# Patient Record
Sex: FEMALE | Race: WHITE | NOT HISPANIC OR LATINO | URBAN - METROPOLITAN AREA
[De-identification: names, ages, dates, MRNs, and addresses within clinical notes are randomized per-mention and may not be internally consistent; named-entity substitution may affect disease eponyms.]

---

## 2018-01-15 ENCOUNTER — CONVERSION ENCOUNTER (OUTPATIENT)
Dept: SURGERY | Facility: CLINIC | Age: 83
End: 2018-01-15

## 2018-01-15 ENCOUNTER — OFFICE VISIT CONVERTED (OUTPATIENT)
Dept: UROLOGY | Facility: CLINIC | Age: 83
End: 2018-01-15
Attending: UROLOGY

## 2018-03-01 ENCOUNTER — OFFICE VISIT CONVERTED (OUTPATIENT)
Dept: CARDIOLOGY | Facility: CLINIC | Age: 83
End: 2018-03-01
Attending: INTERNAL MEDICINE

## 2018-05-16 ENCOUNTER — OFFICE VISIT CONVERTED (OUTPATIENT)
Dept: CARDIOLOGY | Facility: CLINIC | Age: 83
End: 2018-05-16
Attending: INTERNAL MEDICINE

## 2018-08-22 ENCOUNTER — OFFICE VISIT CONVERTED (OUTPATIENT)
Dept: CARDIOLOGY | Facility: CLINIC | Age: 83
End: 2018-08-22
Attending: INTERNAL MEDICINE

## 2019-01-11 ENCOUNTER — HOSPITAL ENCOUNTER (OUTPATIENT)
Dept: OTHER | Facility: HOSPITAL | Age: 84
Discharge: HOME | End: 2019-01-11
Attending: INTERNAL MEDICINE

## 2019-01-11 LAB
ANION GAP SERPL CALC-SCNC: 17 MMOL/L (ref 8–19)
BNP SERPL-MCNC: 3445 PG/ML (ref 0–1800)
BUN SERPL-MCNC: 63 MG/DL (ref 5–25)
BUN/CREAT SERPL: 26 {RATIO} (ref 6–20)
CALCIUM SERPL-MCNC: 9.1 MG/DL (ref 8.7–10.4)
CHLORIDE SERPL-SCNC: 107 MMOL/L (ref 99–111)
CHOLEST SERPL-MCNC: 147 MG/DL (ref 107–200)
CHOLEST/HDLC SERPL: 2.3 {RATIO} (ref 3–6)
CONV CO2: 24 MMOL/L (ref 22–32)
CREAT UR-MCNC: 2.46 MG/DL (ref 0.5–0.9)
GFR SERPLBLD BASED ON 1.73 SQ M-ARVRAT: 16 ML/MIN/{1.73_M2}
GLUCOSE SERPL-MCNC: 113 MG/DL (ref 65–99)
HDLC SERPL-MCNC: 64 MG/DL (ref 40–60)
LDLC SERPL CALC-MCNC: 69 MG/DL (ref 70–100)
OSMOLALITY SERPL CALC.SUM OF ELEC: 317 MOSM/KG (ref 273–304)
POTASSIUM SERPL-SCNC: 4.3 MMOL/L (ref 3.5–5.3)
SODIUM SERPL-SCNC: 144 MMOL/L (ref 135–147)
TRIGL SERPL-MCNC: 69 MG/DL (ref 40–150)
VLDLC SERPL-MCNC: 14 MG/DL (ref 5–37)

## 2019-01-17 ENCOUNTER — OFFICE VISIT CONVERTED (OUTPATIENT)
Dept: CARDIOLOGY | Facility: CLINIC | Age: 84
End: 2019-01-17
Attending: INTERNAL MEDICINE

## 2019-02-26 ENCOUNTER — HOSPITAL ENCOUNTER (OUTPATIENT)
Dept: URGENT CARE | Facility: CLINIC | Age: 84
Discharge: HOME | End: 2019-02-26
Attending: PHYSICIAN ASSISTANT

## 2019-03-12 ENCOUNTER — HOSPITAL ENCOUNTER (OUTPATIENT)
Dept: OTHER | Facility: HOSPITAL | Age: 84
Discharge: HOME | End: 2019-03-12
Attending: INTERNAL MEDICINE

## 2019-03-12 LAB
ANION GAP SERPL CALC-SCNC: 15 MMOL/L (ref 8–19)
BNP SERPL-MCNC: 3182 PG/ML (ref 0–1800)
BUN SERPL-MCNC: 51 MG/DL (ref 5–25)
BUN/CREAT SERPL: 26 {RATIO} (ref 6–20)
CALCIUM SERPL-MCNC: 9.1 MG/DL (ref 8.7–10.4)
CHLORIDE SERPL-SCNC: 105 MMOL/L (ref 99–111)
CONV CO2: 26 MMOL/L (ref 22–32)
CREAT UR-MCNC: 1.99 MG/DL (ref 0.5–0.9)
GFR SERPLBLD BASED ON 1.73 SQ M-ARVRAT: 21 ML/MIN/{1.73_M2}
GLUCOSE SERPL-MCNC: 157 MG/DL (ref 65–99)
OSMOLALITY SERPL CALC.SUM OF ELEC: 311 MOSM/KG (ref 273–304)
POTASSIUM SERPL-SCNC: 4.2 MMOL/L (ref 3.5–5.3)
SODIUM SERPL-SCNC: 142 MMOL/L (ref 135–147)

## 2019-04-03 ENCOUNTER — HOSPITAL ENCOUNTER (OUTPATIENT)
Dept: OTHER | Facility: HOSPITAL | Age: 84
Discharge: HOME | End: 2019-04-03
Attending: INTERNAL MEDICINE

## 2019-04-03 LAB
25(OH)D3 SERPL-MCNC: 43 NG/ML (ref 30–100)
ANION GAP SERPL CALC-SCNC: 17 MMOL/L (ref 8–19)
APPEARANCE UR: CLEAR
BASOPHILS # BLD AUTO: 0.03 10*3/UL (ref 0–0.2)
BASOPHILS NFR BLD AUTO: 0.4 % (ref 0–3)
BILIRUB UR QL: NEGATIVE
BUN SERPL-MCNC: 70 MG/DL (ref 5–25)
BUN/CREAT SERPL: 34 {RATIO} (ref 6–20)
CALCIUM SERPL-MCNC: 9.4 MG/DL (ref 8.7–10.4)
CHLORIDE SERPL-SCNC: 104 MMOL/L (ref 99–111)
COLOR UR: YELLOW
CONV ABS IMM GRAN: 0.02 10*3/UL (ref 0–0.2)
CONV CO2: 27 MMOL/L (ref 22–32)
CONV COLLECTION SOURCE (UA): NORMAL
CONV CREATININE URINE, RANDOM: 45.7 MG/DL (ref 10–300)
CONV IMMATURE GRAN: 0.2 % (ref 0–1.8)
CONV UROBILINOGEN IN URINE BY AUTOMATED TEST STRIP: 0.2 {EHRLICHU}/DL (ref 0.1–1)
CREAT UR-MCNC: 2.07 MG/DL (ref 0.5–0.9)
DEPRECATED RDW RBC AUTO: 45.1 FL (ref 36.4–46.3)
EOSINOPHIL # BLD AUTO: 0.29 10*3/UL (ref 0–0.7)
EOSINOPHIL # BLD AUTO: 3.6 % (ref 0–7)
ERYTHROCYTE [DISTWIDTH] IN BLOOD BY AUTOMATED COUNT: 12.8 % (ref 11.7–14.4)
FERRITIN SERPL-MCNC: 30 NG/ML (ref 10–200)
GFR SERPLBLD BASED ON 1.73 SQ M-ARVRAT: 20 ML/MIN/{1.73_M2}
GLUCOSE SERPL-MCNC: 114 MG/DL (ref 65–99)
GLUCOSE UR QL: NEGATIVE MG/DL
HBA1C MFR BLD: 12.1 G/DL (ref 12–16)
HCT VFR BLD AUTO: 38 % (ref 37–47)
HGB UR QL STRIP: NEGATIVE
IRON SATN MFR SERPL: 17 % (ref 20–55)
IRON SERPL-MCNC: 62 UG/DL (ref 60–170)
KETONES UR QL STRIP: NEGATIVE MG/DL
LEUKOCYTE ESTERASE UR QL STRIP: NEGATIVE
LYMPHOCYTES # BLD AUTO: 3 10*3/UL (ref 1–5)
MCH RBC QN AUTO: 30.4 PG (ref 27–31)
MCHC RBC AUTO-ENTMCNC: 31.8 G/DL (ref 33–37)
MCV RBC AUTO: 95.5 FL (ref 81–99)
MONOCYTES # BLD AUTO: 1.05 10*3/UL (ref 0.2–1.2)
MONOCYTES NFR BLD AUTO: 13.1 % (ref 3–10)
NEUTROPHILS # BLD AUTO: 3.65 10*3/UL (ref 2–8)
NEUTROPHILS NFR BLD AUTO: 45.4 % (ref 30–85)
NITRITE UR QL STRIP: NEGATIVE
NRBC CBCN: 0 % (ref 0–0.7)
OSMOLALITY SERPL CALC.SUM OF ELEC: 317 MOSM/KG (ref 273–304)
PH UR STRIP.AUTO: 5 [PH] (ref 5–8)
PHOSPHATE SERPL-MCNC: 4.2 MG/DL (ref 2.4–4.5)
PLATELET # BLD AUTO: 125 10*3/UL (ref 130–400)
PMV BLD AUTO: 10.6 FL (ref 9.4–12.3)
POTASSIUM SERPL-SCNC: 4.7 MMOL/L (ref 3.5–5.3)
PROT UR QL: NORMAL MG/DL
PROT UR-MCNC: 29.3 MG/DL
PTH-INTACT SERPL-MCNC: 70.9 PG/ML (ref 11.1–79.5)
RBC # BLD AUTO: 3.98 10*6/UL (ref 4.2–5.4)
SODIUM SERPL-SCNC: 143 MMOL/L (ref 135–147)
SP GR UR: 1.02 (ref 1–1.03)
TIBC SERPL-MCNC: 363 UG/DL (ref 245–450)
TRANSFERRIN SERPL-MCNC: 254 MG/DL (ref 250–380)
VARIANT LYMPHS NFR BLD MANUAL: 37.3 % (ref 20–45)
WBC # BLD AUTO: 8.04 10*3/UL (ref 4.8–10.8)

## 2019-07-01 ENCOUNTER — HOSPITAL ENCOUNTER (OUTPATIENT)
Dept: OTHER | Facility: HOSPITAL | Age: 84
Discharge: HOME | End: 2019-07-01
Attending: INTERNAL MEDICINE

## 2019-07-01 LAB
ANION GAP SERPL CALC-SCNC: 19 MMOL/L (ref 8–19)
BNP SERPL-MCNC: 5755 PG/ML (ref 0–1800)
BUN SERPL-MCNC: 70 MG/DL (ref 5–25)
BUN/CREAT SERPL: 33 {RATIO} (ref 6–20)
CALCIUM SERPL-MCNC: 8.9 MG/DL (ref 8.7–10.4)
CHLORIDE SERPL-SCNC: 107 MMOL/L (ref 99–111)
CONV CO2: 23 MMOL/L (ref 22–32)
CREAT UR-MCNC: 2.09 MG/DL (ref 0.5–0.9)
GFR SERPLBLD BASED ON 1.73 SQ M-ARVRAT: 20 ML/MIN/{1.73_M2}
GLUCOSE SERPL-MCNC: 108 MG/DL (ref 65–99)
OSMOLALITY SERPL CALC.SUM OF ELEC: 321 MOSM/KG (ref 273–304)
POTASSIUM SERPL-SCNC: 4.4 MMOL/L (ref 3.5–5.3)
SODIUM SERPL-SCNC: 145 MMOL/L (ref 135–147)

## 2019-07-10 ENCOUNTER — HOSPITAL ENCOUNTER (OUTPATIENT)
Dept: OTHER | Facility: HOSPITAL | Age: 84
Discharge: HOME | End: 2019-07-10
Attending: INTERNAL MEDICINE

## 2019-07-10 LAB
ALBUMIN SERPL-MCNC: 3.9 G/DL (ref 3.5–5)
ALBUMIN/GLOB SERPL: 1.1 {RATIO} (ref 1.4–2.6)
ALP SERPL-CCNC: 63 U/L (ref 38–185)
ALT SERPL-CCNC: 12 U/L (ref 10–40)
ANION GAP SERPL CALC-SCNC: 21 MMOL/L (ref 8–19)
AST SERPL-CCNC: 13 U/L (ref 15–50)
BILIRUB SERPL-MCNC: 0.52 MG/DL (ref 0.2–1.3)
BNP SERPL-MCNC: 5744 PG/ML (ref 0–1800)
BUN SERPL-MCNC: 79 MG/DL (ref 5–25)
BUN/CREAT SERPL: 36 {RATIO} (ref 6–20)
CALCIUM SERPL-MCNC: 9 MG/DL (ref 8.7–10.4)
CHLORIDE SERPL-SCNC: 108 MMOL/L (ref 99–111)
CHOLEST SERPL-MCNC: 117 MG/DL (ref 107–200)
CHOLEST/HDLC SERPL: 2.5 {RATIO} (ref 3–6)
CONV CO2: 21 MMOL/L (ref 22–32)
CONV TOTAL PROTEIN: 7.4 G/DL (ref 6.3–8.2)
CREAT UR-MCNC: 2.2 MG/DL (ref 0.5–0.9)
GFR SERPLBLD BASED ON 1.73 SQ M-ARVRAT: 19 ML/MIN/{1.73_M2}
GLOBULIN UR ELPH-MCNC: 3.5 G/DL (ref 2–3.5)
GLUCOSE SERPL-MCNC: 111 MG/DL (ref 65–99)
HDLC SERPL-MCNC: 46 MG/DL (ref 40–60)
LDLC SERPL CALC-MCNC: 54 MG/DL (ref 70–100)
OSMOLALITY SERPL CALC.SUM OF ELEC: 324 MOSM/KG (ref 273–304)
POTASSIUM SERPL-SCNC: 4.8 MMOL/L (ref 3.5–5.3)
SODIUM SERPL-SCNC: 145 MMOL/L (ref 135–147)
TRIGL SERPL-MCNC: 83 MG/DL (ref 40–150)
VLDLC SERPL-MCNC: 17 MG/DL (ref 5–37)

## 2019-07-17 ENCOUNTER — OFFICE VISIT CONVERTED (OUTPATIENT)
Dept: CARDIOLOGY | Facility: CLINIC | Age: 84
End: 2019-07-17
Attending: NURSE PRACTITIONER

## 2019-08-07 ENCOUNTER — HOSPITAL ENCOUNTER (OUTPATIENT)
Dept: OTHER | Facility: HOSPITAL | Age: 84
Discharge: HOME | End: 2019-08-07
Attending: INTERNAL MEDICINE

## 2019-08-07 LAB
ANION GAP SERPL CALC-SCNC: 22 MMOL/L (ref 8–19)
APPEARANCE UR: CLEAR
BASOPHILS # BLD AUTO: 0.03 10*3/UL (ref 0–0.2)
BASOPHILS NFR BLD AUTO: 0.4 % (ref 0–3)
BILIRUB UR QL: NEGATIVE
BNP SERPL-MCNC: 4998 PG/ML (ref 0–1800)
BUN SERPL-MCNC: 82 MG/DL (ref 5–25)
BUN/CREAT SERPL: 39 {RATIO} (ref 6–20)
CALCIUM SERPL-MCNC: 9.9 MG/DL (ref 8.7–10.4)
CHLORIDE SERPL-SCNC: 95 MMOL/L (ref 99–111)
COLOR UR: YELLOW
CONV ABS IMM GRAN: 0.08 10*3/UL (ref 0–0.2)
CONV BACTERIA: NEGATIVE
CONV CO2: 30 MMOL/L (ref 22–32)
CONV COLLECTION SOURCE (UA): ABNORMAL
CONV CREATININE URINE, RANDOM: 57.9 MG/DL (ref 10–300)
CONV HYALINE CASTS IN URINE MICRO: ABNORMAL /[LPF]
CONV IMMATURE GRAN: 1.1 % (ref 0–1.8)
CONV UROBILINOGEN IN URINE BY AUTOMATED TEST STRIP: 0.2 {EHRLICHU}/DL (ref 0.1–1)
CREAT UR-MCNC: 2.1 MG/DL (ref 0.5–0.9)
DEPRECATED RDW RBC AUTO: 51.1 FL (ref 36.4–46.3)
EOSINOPHIL # BLD AUTO: 0.19 10*3/UL (ref 0–0.7)
EOSINOPHIL # BLD AUTO: 2.6 % (ref 0–7)
ERYTHROCYTE [DISTWIDTH] IN BLOOD BY AUTOMATED COUNT: 14.6 % (ref 11.7–14.4)
GFR SERPLBLD BASED ON 1.73 SQ M-ARVRAT: 20 ML/MIN/{1.73_M2}
GLUCOSE SERPL-MCNC: 125 MG/DL (ref 65–99)
GLUCOSE UR QL: NEGATIVE MG/DL
HCT VFR BLD AUTO: 36.4 % (ref 37–47)
HGB BLD-MCNC: 11.2 G/DL (ref 12–16)
HGB UR QL STRIP: NEGATIVE
KETONES UR QL STRIP: NEGATIVE MG/DL
LEUKOCYTE ESTERASE UR QL STRIP: ABNORMAL
LYMPHOCYTES # BLD AUTO: 2.46 10*3/UL (ref 1–5)
LYMPHOCYTES NFR BLD AUTO: 34 % (ref 20–45)
MCH RBC QN AUTO: 30.4 PG (ref 27–31)
MCHC RBC AUTO-ENTMCNC: 30.8 G/DL (ref 33–37)
MCV RBC AUTO: 98.9 FL (ref 81–99)
MONOCYTES # BLD AUTO: 1.05 10*3/UL (ref 0.2–1.2)
MONOCYTES NFR BLD AUTO: 14.5 % (ref 3–10)
NEUTROPHILS # BLD AUTO: 3.43 10*3/UL (ref 2–8)
NEUTROPHILS NFR BLD AUTO: 47.4 % (ref 30–85)
NITRITE UR QL STRIP: NEGATIVE
NRBC CBCN: 0 % (ref 0–0.7)
OSMOLALITY SERPL CALC.SUM OF ELEC: 322 MOSM/KG (ref 273–304)
PH UR STRIP.AUTO: 5.5 [PH] (ref 5–8)
PLATELET # BLD AUTO: 171 10*3/UL (ref 130–400)
PMV BLD AUTO: 9.7 FL (ref 9.4–12.3)
POTASSIUM SERPL-SCNC: 4.2 MMOL/L (ref 3.5–5.3)
PROT UR QL: ABNORMAL MG/DL
PROT UR-MCNC: 23.7 MG/DL
RBC # BLD AUTO: 3.68 10*6/UL (ref 4.2–5.4)
RBC #/AREA URNS HPF: ABNORMAL /[HPF]
SODIUM SERPL-SCNC: 143 MMOL/L (ref 135–147)
SP GR UR: 1.01 (ref 1–1.03)
SQUAMOUS SPT QL MICRO: ABNORMAL /[HPF]
WBC # BLD AUTO: 7.24 10*3/UL (ref 4.8–10.8)
WBC #/AREA URNS HPF: ABNORMAL /[HPF]

## 2019-08-29 ENCOUNTER — HOSPITAL ENCOUNTER (OUTPATIENT)
Dept: OTHER | Facility: HOSPITAL | Age: 84
Discharge: HOME | End: 2019-08-29
Attending: INTERNAL MEDICINE

## 2019-08-29 LAB
ANION GAP SERPL CALC-SCNC: 21 MMOL/L (ref 8–19)
BASOPHILS # BLD AUTO: 0.02 10*3/UL (ref 0–0.2)
BASOPHILS NFR BLD AUTO: 0.3 % (ref 0–3)
BNP SERPL-MCNC: 2628 PG/ML (ref 0–1800)
BUN SERPL-MCNC: 86 MG/DL (ref 5–25)
BUN/CREAT SERPL: 41 {RATIO} (ref 6–20)
CALCIUM SERPL-MCNC: 9 MG/DL (ref 8.7–10.4)
CHLORIDE SERPL-SCNC: 103 MMOL/L (ref 99–111)
CONV ABS IMM GRAN: 0.01 10*3/UL (ref 0–0.2)
CONV CO2: 26 MMOL/L (ref 22–32)
CONV IMMATURE GRAN: 0.2 % (ref 0–1.8)
CREAT UR-MCNC: 2.12 MG/DL (ref 0.5–0.9)
DEPRECATED RDW RBC AUTO: 45.6 FL (ref 36.4–46.3)
EOSINOPHIL # BLD AUTO: 0.27 10*3/UL (ref 0–0.7)
EOSINOPHIL # BLD AUTO: 4.3 % (ref 0–7)
ERYTHROCYTE [DISTWIDTH] IN BLOOD BY AUTOMATED COUNT: 12.9 % (ref 11.7–14.4)
GFR SERPLBLD BASED ON 1.73 SQ M-ARVRAT: 20 ML/MIN/{1.73_M2}
GLUCOSE SERPL-MCNC: 103 MG/DL (ref 65–99)
HCT VFR BLD AUTO: 41 % (ref 37–47)
HGB BLD-MCNC: 12.3 G/DL (ref 12–16)
LYMPHOCYTES # BLD AUTO: 2.75 10*3/UL (ref 1–5)
LYMPHOCYTES NFR BLD AUTO: 44.3 % (ref 20–45)
MCH RBC QN AUTO: 29.1 PG (ref 27–31)
MCHC RBC AUTO-ENTMCNC: 30 G/DL (ref 33–37)
MCV RBC AUTO: 97.2 FL (ref 81–99)
MONOCYTES # BLD AUTO: 0.82 10*3/UL (ref 0.2–1.2)
MONOCYTES NFR BLD AUTO: 13.2 % (ref 3–10)
NEUTROPHILS # BLD AUTO: 2.34 10*3/UL (ref 2–8)
NEUTROPHILS NFR BLD AUTO: 37.7 % (ref 30–85)
NRBC CBCN: 0 % (ref 0–0.7)
OSMOLALITY SERPL CALC.SUM OF ELEC: 326 MOSM/KG (ref 273–304)
PLATELET # BLD AUTO: 126 10*3/UL (ref 130–400)
PMV BLD AUTO: 10.6 FL (ref 9.4–12.3)
POTASSIUM SERPL-SCNC: 4.9 MMOL/L (ref 3.5–5.3)
RBC # BLD AUTO: 4.22 10*6/UL (ref 4.2–5.4)
SODIUM SERPL-SCNC: 145 MMOL/L (ref 135–147)
WBC # BLD AUTO: 6.21 10*3/UL (ref 4.8–10.8)

## 2019-11-11 ENCOUNTER — HOSPITAL ENCOUNTER (OUTPATIENT)
Dept: OTHER | Facility: HOSPITAL | Age: 84
Discharge: HOME | End: 2019-11-11
Attending: NURSE PRACTITIONER

## 2019-11-11 LAB
ANION GAP SERPL CALC-SCNC: 22 MMOL/L (ref 8–19)
BNP SERPL-MCNC: 3133 PG/ML (ref 0–1800)
BUN SERPL-MCNC: 95 MG/DL (ref 5–25)
BUN/CREAT SERPL: 39 {RATIO} (ref 6–20)
CALCIUM SERPL-MCNC: 9.6 MG/DL (ref 8.7–10.4)
CHLORIDE SERPL-SCNC: 101 MMOL/L (ref 99–111)
CONV CO2: 26 MMOL/L (ref 22–32)
CREAT UR-MCNC: 2.43 MG/DL (ref 0.5–0.9)
GFR SERPLBLD BASED ON 1.73 SQ M-ARVRAT: 17 ML/MIN/{1.73_M2}
GLUCOSE SERPL-MCNC: 152 MG/DL (ref 65–99)
OSMOLALITY SERPL CALC.SUM OF ELEC: 330 MOSM/KG (ref 273–304)
POTASSIUM SERPL-SCNC: 4.9 MMOL/L (ref 3.5–5.3)
SODIUM SERPL-SCNC: 144 MMOL/L (ref 135–147)

## 2019-11-18 ENCOUNTER — OFFICE VISIT CONVERTED (OUTPATIENT)
Dept: CARDIOLOGY | Facility: CLINIC | Age: 84
End: 2019-11-18
Attending: INTERNAL MEDICINE

## 2019-11-18 ENCOUNTER — CONVERSION ENCOUNTER (OUTPATIENT)
Dept: CARDIOLOGY | Facility: CLINIC | Age: 84
End: 2019-11-18

## 2019-11-26 ENCOUNTER — HOSPITAL ENCOUNTER (OUTPATIENT)
Dept: OTHER | Facility: HOSPITAL | Age: 84
Discharge: HOME | End: 2019-11-26
Attending: INTERNAL MEDICINE

## 2019-11-26 LAB
ANION GAP SERPL CALC-SCNC: 19 MMOL/L (ref 8–19)
BASOPHILS # BLD AUTO: 0.03 10*3/UL (ref 0–0.2)
BASOPHILS NFR BLD AUTO: 0.4 % (ref 0–3)
BNP SERPL-MCNC: 3479 PG/ML (ref 0–1800)
BUN SERPL-MCNC: 100 MG/DL (ref 5–25)
BUN/CREAT SERPL: 45 {RATIO} (ref 6–20)
CALCIUM SERPL-MCNC: 9.6 MG/DL (ref 8.7–10.4)
CHLORIDE SERPL-SCNC: 97 MMOL/L (ref 99–111)
CONV ABS IMM GRAN: 0.02 10*3/UL (ref 0–0.2)
CONV CO2: 27 MMOL/L (ref 22–32)
CONV IMMATURE GRAN: 0.3 % (ref 0–1.8)
CREAT UR-MCNC: 2.2 MG/DL (ref 0.5–0.9)
DEPRECATED RDW RBC AUTO: 47.3 FL (ref 36.4–46.3)
EOSINOPHIL # BLD AUTO: 0.28 10*3/UL (ref 0–0.7)
EOSINOPHIL # BLD AUTO: 4.2 % (ref 0–7)
ERYTHROCYTE [DISTWIDTH] IN BLOOD BY AUTOMATED COUNT: 13.7 % (ref 11.7–14.4)
GFR SERPLBLD BASED ON 1.73 SQ M-ARVRAT: 19 ML/MIN/{1.73_M2}
GLUCOSE SERPL-MCNC: 108 MG/DL (ref 65–99)
HCT VFR BLD AUTO: 33.3 % (ref 37–47)
HGB BLD-MCNC: 10.6 G/DL (ref 12–16)
IRON SATN MFR SERPL: 39 % (ref 20–55)
IRON SERPL-MCNC: 119 UG/DL (ref 60–170)
LYMPHOCYTES # BLD AUTO: 2.11 10*3/UL (ref 1–5)
LYMPHOCYTES NFR BLD AUTO: 31.4 % (ref 20–45)
MCH RBC QN AUTO: 30.3 PG (ref 27–31)
MCHC RBC AUTO-ENTMCNC: 31.8 G/DL (ref 33–37)
MCV RBC AUTO: 95.1 FL (ref 81–99)
MONOCYTES # BLD AUTO: 0.83 10*3/UL (ref 0.2–1.2)
MONOCYTES NFR BLD AUTO: 12.4 % (ref 3–10)
NEUTROPHILS # BLD AUTO: 3.44 10*3/UL (ref 2–8)
NEUTROPHILS NFR BLD AUTO: 51.3 % (ref 30–85)
NRBC CBCN: 0 % (ref 0–0.7)
OSMOLALITY SERPL CALC.SUM OF ELEC: 318 MOSM/KG (ref 273–304)
PLATELET # BLD AUTO: 131 10*3/UL (ref 130–400)
PMV BLD AUTO: 10.6 FL (ref 9.4–12.3)
POTASSIUM SERPL-SCNC: 4.5 MMOL/L (ref 3.5–5.3)
RBC # BLD AUTO: 3.5 10*6/UL (ref 4.2–5.4)
SODIUM SERPL-SCNC: 138 MMOL/L (ref 135–147)
TIBC SERPL-MCNC: 309 UG/DL (ref 245–450)
TRANSFERRIN SERPL-MCNC: 216 MG/DL (ref 250–380)
WBC # BLD AUTO: 6.71 10*3/UL (ref 4.8–10.8)

## 2019-12-09 ENCOUNTER — HOSPITAL ENCOUNTER (OUTPATIENT)
Dept: OTHER | Facility: HOSPITAL | Age: 84
Discharge: HOME | End: 2019-12-09
Attending: INTERNAL MEDICINE

## 2019-12-09 LAB
25(OH)D3 SERPL-MCNC: 47.6 NG/ML (ref 30–100)
ANION GAP SERPL CALC-SCNC: 23 MMOL/L (ref 8–19)
APPEARANCE UR: CLEAR
BASOPHILS # BLD AUTO: 0.02 10*3/UL (ref 0–0.2)
BASOPHILS NFR BLD AUTO: 0.3 % (ref 0–3)
BILIRUB UR QL: NEGATIVE
BUN SERPL-MCNC: 89 MG/DL (ref 5–25)
BUN/CREAT SERPL: 39 {RATIO} (ref 6–20)
CALCIUM SERPL-MCNC: 9.3 MG/DL (ref 8.7–10.4)
CHLORIDE SERPL-SCNC: 102 MMOL/L (ref 99–111)
COLOR UR: YELLOW
CONV ABS IMM GRAN: 0.02 10*3/UL (ref 0–0.2)
CONV BACTERIA: ABNORMAL
CONV CO2: 24 MMOL/L (ref 22–32)
CONV COLLECTION SOURCE (UA): ABNORMAL
CONV CREATININE URINE, RANDOM: 63.8 MG/DL (ref 10–300)
CONV HYALINE CASTS IN URINE MICRO: ABNORMAL /[LPF]
CONV IMMATURE GRAN: 0.3 % (ref 0–1.8)
CONV UROBILINOGEN IN URINE BY AUTOMATED TEST STRIP: 0.2 {EHRLICHU}/DL (ref 0.1–1)
CREAT UR-MCNC: 2.3 MG/DL (ref 0.5–0.9)
DEPRECATED RDW RBC AUTO: 47.3 FL (ref 36.4–46.3)
EOSINOPHIL # BLD AUTO: 0.39 10*3/UL (ref 0–0.7)
EOSINOPHIL # BLD AUTO: 6.5 % (ref 0–7)
ERYTHROCYTE [DISTWIDTH] IN BLOOD BY AUTOMATED COUNT: 13 % (ref 11.7–14.4)
FERRITIN SERPL-MCNC: 167 NG/ML (ref 10–200)
GFR SERPLBLD BASED ON 1.73 SQ M-ARVRAT: 18 ML/MIN/{1.73_M2}
GLUCOSE SERPL-MCNC: 105 MG/DL (ref 65–99)
GLUCOSE UR QL: NEGATIVE MG/DL
HCT VFR BLD AUTO: 31.3 % (ref 37–47)
HGB BLD-MCNC: 9.7 G/DL (ref 12–16)
HGB UR QL STRIP: NEGATIVE
IRON SATN MFR SERPL: 30 % (ref 20–55)
IRON SERPL-MCNC: 84 UG/DL (ref 60–170)
KETONES UR QL STRIP: NEGATIVE MG/DL
LEUKOCYTE ESTERASE UR QL STRIP: ABNORMAL
LYMPHOCYTES # BLD AUTO: 1.97 10*3/UL (ref 1–5)
LYMPHOCYTES NFR BLD AUTO: 32.6 % (ref 20–45)
MCH RBC QN AUTO: 30.7 PG (ref 27–31)
MCHC RBC AUTO-ENTMCNC: 31 G/DL (ref 33–37)
MCV RBC AUTO: 99.1 FL (ref 81–99)
MONOCYTES # BLD AUTO: 0.86 10*3/UL (ref 0.2–1.2)
MONOCYTES NFR BLD AUTO: 14.2 % (ref 3–10)
NEUTROPHILS # BLD AUTO: 2.78 10*3/UL (ref 2–8)
NEUTROPHILS NFR BLD AUTO: 46.1 % (ref 30–85)
NITRITE UR QL STRIP: NEGATIVE
NRBC CBCN: 0 % (ref 0–0.7)
OSMOLALITY SERPL CALC.SUM OF ELEC: 326 MOSM/KG (ref 273–304)
PH UR STRIP.AUTO: 5.5 [PH] (ref 5–8)
PHOSPHATE SERPL-MCNC: 6.1 MG/DL (ref 2.4–4.5)
PLATELET # BLD AUTO: 116 10*3/UL (ref 130–400)
PMV BLD AUTO: 10.2 FL (ref 9.4–12.3)
POTASSIUM SERPL-SCNC: 4.9 MMOL/L (ref 3.5–5.3)
PROT UR QL: NEGATIVE MG/DL
PROT UR-MCNC: 10.4 MG/DL
PTH-INTACT SERPL-MCNC: 111 PG/ML (ref 11.1–79.5)
RBC # BLD AUTO: 3.16 10*6/UL (ref 4.2–5.4)
RBC #/AREA URNS HPF: ABNORMAL /[HPF]
SODIUM SERPL-SCNC: 144 MMOL/L (ref 135–147)
SP GR UR: 1.01 (ref 1–1.03)
SQUAMOUS SPT QL MICRO: ABNORMAL /[HPF]
TIBC SERPL-MCNC: 283 UG/DL (ref 245–450)
TRANSFERRIN SERPL-MCNC: 198 MG/DL (ref 250–380)
WBC # BLD AUTO: 6.04 10*3/UL (ref 4.8–10.8)
WBC #/AREA URNS HPF: ABNORMAL /[HPF]

## 2020-01-07 ENCOUNTER — HOSPITAL ENCOUNTER (OUTPATIENT)
Dept: OTHER | Facility: HOSPITAL | Age: 85
Discharge: HOME | End: 2020-01-07
Attending: INTERNAL MEDICINE

## 2020-01-07 LAB
HCT VFR BLD AUTO: 33.5 % (ref 37–47)
HGB BLD-MCNC: 10.4 G/DL (ref 12–16)

## 2020-03-03 ENCOUNTER — HOSPITAL ENCOUNTER (OUTPATIENT)
Dept: MAMMOGRAPHY | Facility: HOSPITAL | Age: 85
Discharge: HOME | End: 2020-03-03
Attending: INTERNAL MEDICINE

## 2020-03-10 ENCOUNTER — HOSPITAL ENCOUNTER (OUTPATIENT)
Dept: ULTRASOUND IMAGING | Facility: HOSPITAL | Age: 85
Discharge: HOME | End: 2020-03-10
Attending: INTERNAL MEDICINE

## 2020-03-18 ENCOUNTER — OFFICE VISIT CONVERTED (OUTPATIENT)
Dept: SURGERY | Facility: CLINIC | Age: 85
End: 2020-03-18
Attending: SURGERY

## 2020-03-19 ENCOUNTER — OFFICE VISIT CONVERTED (OUTPATIENT)
Dept: ONCOLOGY | Facility: HOSPITAL | Age: 85
End: 2020-03-19
Attending: INTERNAL MEDICINE

## 2020-03-19 ENCOUNTER — HOSPITAL ENCOUNTER (OUTPATIENT)
Dept: ONCOLOGY | Facility: HOSPITAL | Age: 85
Discharge: HOME | End: 2020-03-19
Attending: INTERNAL MEDICINE

## 2020-03-20 ENCOUNTER — HOSPITAL ENCOUNTER (OUTPATIENT)
Dept: OTHER | Facility: HOSPITAL | Age: 85
Discharge: HOME | End: 2020-03-20
Attending: INTERNAL MEDICINE

## 2020-03-20 LAB
ANION GAP SERPL CALC-SCNC: 19 MMOL/L (ref 8–19)
APPEARANCE UR: ABNORMAL
BASOPHILS # BLD AUTO: 0.03 10*3/UL (ref 0–0.2)
BASOPHILS NFR BLD AUTO: 0.5 % (ref 0–3)
BILIRUB UR QL: NEGATIVE
BUN SERPL-MCNC: 97 MG/DL (ref 5–25)
BUN/CREAT SERPL: 40 {RATIO} (ref 6–20)
CALCIUM SERPL-MCNC: 9.2 MG/DL (ref 8.7–10.4)
CHLORIDE SERPL-SCNC: 101 MMOL/L (ref 99–111)
COLOR UR: YELLOW
CONV ABS IMM GRAN: 0.02 10*3/UL (ref 0–0.2)
CONV BACTERIA: ABNORMAL
CONV CO2: 27 MMOL/L (ref 22–32)
CONV COLLECTION SOURCE (UA): ABNORMAL
CONV IMMATURE GRAN: 0.4 % (ref 0–1.8)
CONV UROBILINOGEN IN URINE BY AUTOMATED TEST STRIP: 1 {EHRLICHU}/DL (ref 0.1–1)
CREAT UR-MCNC: 2.44 MG/DL (ref 0.5–0.9)
DEPRECATED RDW RBC AUTO: 43.4 FL (ref 36.4–46.3)
EOSINOPHIL # BLD AUTO: 0.3 10*3/UL (ref 0–0.7)
EOSINOPHIL # BLD AUTO: 5.3 % (ref 0–7)
ERYTHROCYTE [DISTWIDTH] IN BLOOD BY AUTOMATED COUNT: 12 % (ref 11.7–14.4)
FERRITIN SERPL-MCNC: 164 NG/ML (ref 10–200)
GFR SERPLBLD BASED ON 1.73 SQ M-ARVRAT: 16 ML/MIN/{1.73_M2}
GLUCOSE SERPL-MCNC: 100 MG/DL (ref 65–99)
GLUCOSE UR QL: NEGATIVE MG/DL
HCT VFR BLD AUTO: 33.8 % (ref 37–47)
HGB BLD-MCNC: 10.3 G/DL (ref 12–16)
HGB UR QL STRIP: NEGATIVE
IRON SATN MFR SERPL: 41 % (ref 20–55)
IRON SERPL-MCNC: 118 UG/DL (ref 60–170)
KETONES UR QL STRIP: NEGATIVE MG/DL
LEUKOCYTE ESTERASE UR QL STRIP: ABNORMAL
LYMPHOCYTES # BLD AUTO: 2.19 10*3/UL (ref 1–5)
LYMPHOCYTES NFR BLD AUTO: 38.7 % (ref 20–45)
MCH RBC QN AUTO: 29.9 PG (ref 27–31)
MCHC RBC AUTO-ENTMCNC: 30.5 G/DL (ref 33–37)
MCV RBC AUTO: 98.3 FL (ref 81–99)
MONOCYTES # BLD AUTO: 0.82 10*3/UL (ref 0.2–1.2)
MONOCYTES NFR BLD AUTO: 14.5 % (ref 3–10)
NEUTROPHILS # BLD AUTO: 2.3 10*3/UL (ref 2–8)
NEUTROPHILS NFR BLD AUTO: 40.6 % (ref 30–85)
NITRITE UR QL STRIP: POSITIVE
NRBC CBCN: 0 % (ref 0–0.7)
OSMOLALITY SERPL CALC.SUM OF ELEC: 324 MOSM/KG (ref 273–304)
PH UR STRIP.AUTO: 5 [PH] (ref 5–8)
PHOSPHATE SERPL-MCNC: 4.9 MG/DL (ref 2.4–4.5)
PLATELET # BLD AUTO: 125 10*3/UL (ref 130–400)
PMV BLD AUTO: 11.2 FL (ref 9.4–12.3)
POTASSIUM SERPL-SCNC: 4.9 MMOL/L (ref 3.5–5.3)
PROT UR QL: 30 MG/DL
PTH-INTACT SERPL-MCNC: 135.3 PG/ML (ref 11.1–79.5)
RBC # BLD AUTO: 3.44 10*6/UL (ref 4.2–5.4)
RBC #/AREA URNS HPF: ABNORMAL /[HPF]
SODIUM SERPL-SCNC: 142 MMOL/L (ref 135–147)
SP GR UR: 1.01 (ref 1–1.03)
SQUAMOUS SPT QL MICRO: ABNORMAL /[HPF]
TIBC SERPL-MCNC: 290 UG/DL (ref 245–450)
TRANSFERRIN SERPL-MCNC: 203 MG/DL (ref 250–380)
WBC # BLD AUTO: 5.66 10*3/UL (ref 4.8–10.8)
WBC #/AREA URNS HPF: ABNORMAL /[HPF]

## 2020-03-31 ENCOUNTER — HOSPITAL ENCOUNTER (OUTPATIENT)
Dept: OTHER | Facility: HOSPITAL | Age: 85
Discharge: HOME | End: 2020-03-31
Attending: INTERNAL MEDICINE

## 2020-03-31 LAB
ANION GAP SERPL CALC-SCNC: 20 MMOL/L (ref 8–19)
BASOPHILS # BLD AUTO: 0.02 10*3/UL (ref 0–0.2)
BASOPHILS NFR BLD AUTO: 0.3 % (ref 0–3)
BNP SERPL-MCNC: 4318 PG/ML (ref 0–1800)
BUN SERPL-MCNC: 92 MG/DL (ref 5–25)
BUN/CREAT SERPL: 38 {RATIO} (ref 6–20)
CALCIUM SERPL-MCNC: 9.1 MG/DL (ref 8.7–10.4)
CHLORIDE SERPL-SCNC: 100 MMOL/L (ref 99–111)
CONV ABS IMM GRAN: 0.03 10*3/UL (ref 0–0.2)
CONV CO2: 26 MMOL/L (ref 22–32)
CONV IMMATURE GRAN: 0.5 % (ref 0–1.8)
CREAT UR-MCNC: 2.4 MG/DL (ref 0.5–0.9)
DEPRECATED RDW RBC AUTO: 43.1 FL (ref 36.4–46.3)
EOSINOPHIL # BLD AUTO: 0.33 10*3/UL (ref 0–0.7)
EOSINOPHIL # BLD AUTO: 5.5 % (ref 0–7)
ERYTHROCYTE [DISTWIDTH] IN BLOOD BY AUTOMATED COUNT: 12 % (ref 11.7–14.4)
GFR SERPLBLD BASED ON 1.73 SQ M-ARVRAT: 17 ML/MIN/{1.73_M2}
GLUCOSE SERPL-MCNC: 116 MG/DL (ref 65–99)
HCT VFR BLD AUTO: 32.6 % (ref 37–47)
HGB BLD-MCNC: 10.1 G/DL (ref 12–16)
IRON SATN MFR SERPL: 33 % (ref 20–55)
IRON SERPL-MCNC: 94 UG/DL (ref 60–170)
LYMPHOCYTES # BLD AUTO: 2.21 10*3/UL (ref 1–5)
LYMPHOCYTES NFR BLD AUTO: 36.7 % (ref 20–45)
MCH RBC QN AUTO: 30.1 PG (ref 27–31)
MCHC RBC AUTO-ENTMCNC: 31 G/DL (ref 33–37)
MCV RBC AUTO: 97 FL (ref 81–99)
MONOCYTES # BLD AUTO: 0.92 10*3/UL (ref 0.2–1.2)
MONOCYTES NFR BLD AUTO: 15.3 % (ref 3–10)
NEUTROPHILS # BLD AUTO: 2.51 10*3/UL (ref 2–8)
NEUTROPHILS NFR BLD AUTO: 41.7 % (ref 30–85)
NRBC CBCN: 0 % (ref 0–0.7)
OSMOLALITY SERPL CALC.SUM OF ELEC: 321 MOSM/KG (ref 273–304)
PLATELET # BLD AUTO: 125 10*3/UL (ref 130–400)
PMV BLD AUTO: 10.3 FL (ref 9.4–12.3)
POTASSIUM SERPL-SCNC: 4.7 MMOL/L (ref 3.5–5.3)
RBC # BLD AUTO: 3.36 10*6/UL (ref 4.2–5.4)
SODIUM SERPL-SCNC: 141 MMOL/L (ref 135–147)
TIBC SERPL-MCNC: 287 UG/DL (ref 245–450)
TRANSFERRIN SERPL-MCNC: 201 MG/DL (ref 250–380)
WBC # BLD AUTO: 6.02 10*3/UL (ref 4.8–10.8)

## 2020-05-05 ENCOUNTER — HOSPITAL ENCOUNTER (OUTPATIENT)
Dept: NUCLEAR MEDICINE | Facility: HOSPITAL | Age: 85
Discharge: HOME | End: 2020-05-05
Attending: INTERNAL MEDICINE

## 2020-05-20 ENCOUNTER — TELEPHONE CONVERTED (OUTPATIENT)
Dept: CARDIOLOGY | Facility: CLINIC | Age: 85
End: 2020-05-20
Attending: INTERNAL MEDICINE

## 2020-06-02 ENCOUNTER — HOSPITAL ENCOUNTER (OUTPATIENT)
Dept: OTHER | Facility: HOSPITAL | Age: 85
Discharge: HOME | End: 2020-06-02
Attending: INTERNAL MEDICINE

## 2020-06-02 LAB
ALBUMIN SERPL-MCNC: 4 G/DL (ref 3.5–5)
ALBUMIN/GLOB SERPL: 1.1 {RATIO} (ref 1.4–2.6)
ALP SERPL-CCNC: 59 U/L (ref 38–185)
ALT SERPL-CCNC: 9 U/L (ref 10–40)
ANION GAP SERPL CALC-SCNC: 17 MMOL/L (ref 8–19)
APPEARANCE UR: ABNORMAL
AST SERPL-CCNC: 14 U/L (ref 15–50)
BASOPHILS # BLD AUTO: 0.03 10*3/UL (ref 0–0.2)
BASOPHILS NFR BLD AUTO: 0.4 % (ref 0–3)
BILIRUB SERPL-MCNC: 0.3 MG/DL (ref 0.2–1.3)
BILIRUB UR QL: NEGATIVE
BNP SERPL-MCNC: 7943 PG/ML (ref 0–1800)
BUN SERPL-MCNC: 91 MG/DL (ref 5–25)
BUN/CREAT SERPL: 43 {RATIO} (ref 6–20)
CALCIUM SERPL-MCNC: 9.2 MG/DL (ref 8.7–10.4)
CHLORIDE SERPL-SCNC: 104 MMOL/L (ref 99–111)
CHOLEST SERPL-MCNC: 125 MG/DL (ref 107–200)
CHOLEST/HDLC SERPL: 2.6 {RATIO} (ref 3–6)
COLOR UR: YELLOW
CONV ABS IMM GRAN: 0.04 10*3/UL (ref 0–0.2)
CONV BACTERIA: ABNORMAL
CONV CO2: 25 MMOL/L (ref 22–32)
CONV COLLECTION SOURCE (UA): ABNORMAL
CONV CREATININE URINE, RANDOM: 63.7 MG/DL (ref 10–300)
CONV HYALINE CASTS IN URINE MICRO: ABNORMAL /[LPF]
CONV IMMATURE GRAN: 0.5 % (ref 0–1.8)
CONV PROTEIN TO CREATININE RATIO (RANDOM URINE): 0.43 {RATIO} (ref 0–0.1)
CONV TOTAL PROTEIN: 7.5 G/DL (ref 6.3–8.2)
CONV UROBILINOGEN IN URINE BY AUTOMATED TEST STRIP: 0.2 {EHRLICHU}/DL (ref 0.1–1)
CREAT UR-MCNC: 2.13 MG/DL (ref 0.5–0.9)
DEPRECATED RDW RBC AUTO: 43.9 FL (ref 36.4–46.3)
EOSINOPHIL # BLD AUTO: 0.47 10*3/UL (ref 0–0.7)
EOSINOPHIL # BLD AUTO: 6.1 % (ref 0–7)
ERYTHROCYTE [DISTWIDTH] IN BLOOD BY AUTOMATED COUNT: 11.9 % (ref 11.7–14.4)
GFR SERPLBLD BASED ON 1.73 SQ M-ARVRAT: 19 ML/MIN/{1.73_M2}
GLOBULIN UR ELPH-MCNC: 3.5 G/DL (ref 2–3.5)
GLUCOSE SERPL-MCNC: 111 MG/DL (ref 65–99)
GLUCOSE UR QL: NEGATIVE MG/DL
HCT VFR BLD AUTO: 32.7 % (ref 37–47)
HDLC SERPL-MCNC: 48 MG/DL (ref 40–60)
HGB BLD-MCNC: 9.8 G/DL (ref 12–16)
HGB UR QL STRIP: NEGATIVE
KETONES UR QL STRIP: NEGATIVE MG/DL
LDLC SERPL CALC-MCNC: 62 MG/DL (ref 70–100)
LEUKOCYTE ESTERASE UR QL STRIP: ABNORMAL
LYMPHOCYTES # BLD AUTO: 2.75 10*3/UL (ref 1–5)
LYMPHOCYTES NFR BLD AUTO: 35.9 % (ref 20–45)
MCH RBC QN AUTO: 29.9 PG (ref 27–31)
MCHC RBC AUTO-ENTMCNC: 30 G/DL (ref 33–37)
MCV RBC AUTO: 99.7 FL (ref 81–99)
MONOCYTES # BLD AUTO: 0.99 10*3/UL (ref 0.2–1.2)
MONOCYTES NFR BLD AUTO: 12.9 % (ref 3–10)
NEUTROPHILS # BLD AUTO: 3.39 10*3/UL (ref 2–8)
NEUTROPHILS NFR BLD AUTO: 44.2 % (ref 30–85)
NITRITE UR QL STRIP: POSITIVE
NRBC CBCN: 0 % (ref 0–0.7)
OSMOLALITY SERPL CALC.SUM OF ELEC: 323 MOSM/KG (ref 273–304)
PH UR STRIP.AUTO: 5 [PH] (ref 5–8)
PHOSPHATE SERPL-MCNC: 4.8 MG/DL (ref 2.4–4.5)
PLATELET # BLD AUTO: 129 10*3/UL (ref 130–400)
PMV BLD AUTO: 10.8 FL (ref 9.4–12.3)
POTASSIUM SERPL-SCNC: 4.4 MMOL/L (ref 3.5–5.3)
PROT UR QL: 30 MG/DL
PROT UR-MCNC: 27.2 MG/DL
RBC # BLD AUTO: 3.28 10*6/UL (ref 4.2–5.4)
RBC #/AREA URNS HPF: ABNORMAL /[HPF]
SODIUM SERPL-SCNC: 142 MMOL/L (ref 135–147)
SP GR UR: 1.01 (ref 1–1.03)
TRIGL SERPL-MCNC: 77 MG/DL (ref 40–150)
VLDLC SERPL-MCNC: 15 MG/DL (ref 5–37)
WBC # BLD AUTO: 7.67 10*3/UL (ref 4.8–10.8)
WBC #/AREA URNS HPF: ABNORMAL /[HPF]

## 2020-06-24 ENCOUNTER — HOSPITAL ENCOUNTER (OUTPATIENT)
Dept: MAMMOGRAPHY | Facility: HOSPITAL | Age: 85
Discharge: HOME | End: 2020-06-24
Attending: NURSE PRACTITIONER

## 2020-06-26 ENCOUNTER — OFFICE VISIT CONVERTED (OUTPATIENT)
Dept: ONCOLOGY | Facility: HOSPITAL | Age: 85
End: 2020-06-26
Attending: INTERNAL MEDICINE

## 2020-07-02 ENCOUNTER — OFFICE VISIT CONVERTED (OUTPATIENT)
Dept: SURGERY | Facility: CLINIC | Age: 85
End: 2020-07-02
Attending: SURGERY

## 2020-07-07 ENCOUNTER — HOSPITAL ENCOUNTER (OUTPATIENT)
Dept: PHYSICAL THERAPY | Facility: CLINIC | Age: 85
Setting detail: RECURRING SERIES
Discharge: STILL ADMITTED OR ADMINISTRATIVE | End: 2020-08-29
Attending: SURGERY

## 2020-07-10 ENCOUNTER — HOSPITAL ENCOUNTER (OUTPATIENT)
Dept: PREADMISSION TESTING | Facility: HOSPITAL | Age: 85
Discharge: HOME | End: 2020-07-10
Attending: SURGERY

## 2020-07-12 LAB — SARS-COV-2 RNA SPEC QL NAA+PROBE: NOT DETECTED

## 2020-07-14 ENCOUNTER — HOSPITAL ENCOUNTER (OUTPATIENT)
Dept: PERIOP | Facility: HOSPITAL | Age: 85
Setting detail: HOSPITAL OUTPATIENT SURGERY
Discharge: HOME | End: 2020-07-14
Attending: SURGERY

## 2020-07-14 LAB — GLUCOSE BLD-MCNC: 104 MG/DL (ref 65–99)

## 2020-07-22 ENCOUNTER — OFFICE VISIT CONVERTED (OUTPATIENT)
Dept: SURGERY | Facility: CLINIC | Age: 85
End: 2020-07-22
Attending: SURGERY

## 2020-07-23 ENCOUNTER — OFFICE VISIT CONVERTED (OUTPATIENT)
Dept: ONCOLOGY | Facility: HOSPITAL | Age: 85
End: 2020-07-23
Attending: INTERNAL MEDICINE

## 2020-07-29 ENCOUNTER — OFFICE VISIT CONVERTED (OUTPATIENT)
Dept: SURGERY | Facility: CLINIC | Age: 85
End: 2020-07-29
Attending: SURGERY

## 2020-07-29 ENCOUNTER — HOSPITAL ENCOUNTER (OUTPATIENT)
Dept: OTHER | Facility: HOSPITAL | Age: 85
Discharge: HOME | End: 2020-07-29
Attending: INTERNAL MEDICINE

## 2020-07-29 LAB
ALBUMIN SERPL-MCNC: 4 G/DL (ref 3.5–5)
ALBUMIN/GLOB SERPL: 1.3 {RATIO} (ref 1.4–2.6)
ALP SERPL-CCNC: 54 U/L (ref 38–185)
ALT SERPL-CCNC: 6 U/L (ref 10–40)
ANION GAP SERPL CALC-SCNC: 18 MMOL/L (ref 8–19)
AST SERPL-CCNC: 12 U/L (ref 15–50)
BASOPHILS # BLD AUTO: 0.03 10*3/UL (ref 0–0.2)
BASOPHILS NFR BLD AUTO: 0.3 % (ref 0–3)
BILIRUB SERPL-MCNC: 0.23 MG/DL (ref 0.2–1.3)
BNP SERPL-MCNC: 8710 PG/ML (ref 0–1800)
BUN SERPL-MCNC: 75 MG/DL (ref 5–25)
BUN/CREAT SERPL: 33 {RATIO} (ref 6–20)
CALCIUM SERPL-MCNC: 9.6 MG/DL (ref 8.7–10.4)
CHLORIDE SERPL-SCNC: 103 MMOL/L (ref 99–111)
CONV ABS IMM GRAN: 0.06 10*3/UL (ref 0–0.2)
CONV CO2: 27 MMOL/L (ref 22–32)
CONV IMMATURE GRAN: 0.6 % (ref 0–1.8)
CONV TOTAL PROTEIN: 7.1 G/DL (ref 6.3–8.2)
CREAT UR-MCNC: 2.25 MG/DL (ref 0.5–0.9)
DEPRECATED RDW RBC AUTO: 43.7 FL (ref 36.4–46.3)
EOSINOPHIL # BLD AUTO: 0.3 10*3/UL (ref 0–0.7)
EOSINOPHIL # BLD AUTO: 3.1 % (ref 0–7)
ERYTHROCYTE [DISTWIDTH] IN BLOOD BY AUTOMATED COUNT: 12 % (ref 11.7–14.4)
GFR SERPLBLD BASED ON 1.73 SQ M-ARVRAT: 18 ML/MIN/{1.73_M2}
GLOBULIN UR ELPH-MCNC: 3.1 G/DL (ref 2–3.5)
GLUCOSE SERPL-MCNC: 60 MG/DL (ref 65–99)
HCT VFR BLD AUTO: 29.9 % (ref 37–47)
HGB BLD-MCNC: 9.2 G/DL (ref 12–16)
IRON SATN MFR SERPL: 18 % (ref 20–55)
IRON SERPL-MCNC: 52 UG/DL (ref 60–170)
LYMPHOCYTES # BLD AUTO: 2.4 10*3/UL (ref 1–5)
LYMPHOCYTES NFR BLD AUTO: 25.2 % (ref 20–45)
MCH RBC QN AUTO: 30.5 PG (ref 27–31)
MCHC RBC AUTO-ENTMCNC: 30.8 G/DL (ref 33–37)
MCV RBC AUTO: 99 FL (ref 81–99)
MONOCYTES # BLD AUTO: 1.21 10*3/UL (ref 0.2–1.2)
MONOCYTES NFR BLD AUTO: 12.7 % (ref 3–10)
NEUTROPHILS # BLD AUTO: 5.53 10*3/UL (ref 2–8)
NEUTROPHILS NFR BLD AUTO: 58.1 % (ref 30–85)
NRBC CBCN: 0 % (ref 0–0.7)
OSMOLALITY SERPL CALC.SUM OF ELEC: 316 MOSM/KG (ref 273–304)
PLATELET # BLD AUTO: 132 10*3/UL (ref 130–400)
PMV BLD AUTO: 10.8 FL (ref 9.4–12.3)
POTASSIUM SERPL-SCNC: 4.9 MMOL/L (ref 3.5–5.3)
RBC # BLD AUTO: 3.02 10*6/UL (ref 4.2–5.4)
SODIUM SERPL-SCNC: 143 MMOL/L (ref 135–147)
T4 FREE SERPL-MCNC: 1.1 NG/DL (ref 0.9–1.8)
TIBC SERPL-MCNC: 292 UG/DL (ref 245–450)
TRANSFERRIN SERPL-MCNC: 204 MG/DL (ref 250–380)
TSH SERPL-ACNC: 0.88 M[IU]/L (ref 0.27–4.2)
WBC # BLD AUTO: 9.53 10*3/UL (ref 4.8–10.8)

## 2020-08-06 ENCOUNTER — HOSPITAL ENCOUNTER (OUTPATIENT)
Dept: CT IMAGING | Facility: HOSPITAL | Age: 85
Discharge: HOME | End: 2020-08-06
Attending: INTERNAL MEDICINE

## 2020-08-13 ENCOUNTER — OFFICE VISIT CONVERTED (OUTPATIENT)
Dept: ONCOLOGY | Facility: HOSPITAL | Age: 85
End: 2020-08-13
Attending: INTERNAL MEDICINE

## 2020-09-02 ENCOUNTER — HOSPITAL ENCOUNTER (OUTPATIENT)
Dept: OTHER | Facility: HOSPITAL | Age: 85
Discharge: HOME | End: 2020-09-02
Attending: INTERNAL MEDICINE

## 2020-09-02 LAB
25(OH)D3 SERPL-MCNC: 43.6 NG/ML (ref 30–100)
ANION GAP SERPL CALC-SCNC: 19 MMOL/L (ref 8–19)
APPEARANCE UR: ABNORMAL
BASOPHILS # BLD AUTO: 0.04 10*3/UL (ref 0–0.2)
BASOPHILS NFR BLD AUTO: 0.5 % (ref 0–3)
BILIRUB UR QL: NEGATIVE
BUN SERPL-MCNC: 103 MG/DL (ref 5–25)
BUN/CREAT SERPL: 37 {RATIO} (ref 6–20)
CALCIUM SERPL-MCNC: 9.5 MG/DL (ref 8.7–10.4)
CHLORIDE SERPL-SCNC: 104 MMOL/L (ref 99–111)
COLOR UR: YELLOW
CONV ABS IMM GRAN: 0.02 10*3/UL (ref 0–0.2)
CONV BACTERIA: ABNORMAL
CONV CO2: 24 MMOL/L (ref 22–32)
CONV COLLECTION SOURCE (UA): ABNORMAL
CONV CREATININE URINE, RANDOM: 66.7 MG/DL (ref 10–300)
CONV HYALINE CASTS IN URINE MICRO: ABNORMAL /[LPF]
CONV IMMATURE GRAN: 0.3 % (ref 0–1.8)
CONV PROTEIN TO CREATININE RATIO (RANDOM URINE): 0.43 {RATIO} (ref 0–0.1)
CONV UROBILINOGEN IN URINE BY AUTOMATED TEST STRIP: 0.2 {EHRLICHU}/DL (ref 0.1–1)
CREAT UR-MCNC: 2.78 MG/DL (ref 0.5–0.9)
DEPRECATED RDW RBC AUTO: 43.7 FL (ref 36.4–46.3)
EOSINOPHIL # BLD AUTO: 0.38 10*3/UL (ref 0–0.7)
EOSINOPHIL # BLD AUTO: 4.8 % (ref 0–7)
ERYTHROCYTE [DISTWIDTH] IN BLOOD BY AUTOMATED COUNT: 11.7 % (ref 11.7–14.4)
FERRITIN SERPL-MCNC: 180 NG/ML (ref 10–200)
GFR SERPLBLD BASED ON 1.73 SQ M-ARVRAT: 14 ML/MIN/{1.73_M2}
GLUCOSE SERPL-MCNC: 110 MG/DL (ref 65–99)
GLUCOSE UR QL: NEGATIVE MG/DL
HCT VFR BLD AUTO: 33.5 % (ref 37–47)
HGB BLD-MCNC: 9.9 G/DL (ref 12–16)
HGB UR QL STRIP: NEGATIVE
IRON SATN MFR SERPL: 31 % (ref 20–55)
IRON SERPL-MCNC: 89 UG/DL (ref 60–170)
KETONES UR QL STRIP: NEGATIVE MG/DL
LEUKOCYTE ESTERASE UR QL STRIP: ABNORMAL
LYMPHOCYTES # BLD AUTO: 2.65 10*3/UL (ref 1–5)
LYMPHOCYTES NFR BLD AUTO: 33.7 % (ref 20–45)
MCH RBC QN AUTO: 29.7 PG (ref 27–31)
MCHC RBC AUTO-ENTMCNC: 29.6 G/DL (ref 33–37)
MCV RBC AUTO: 100.6 FL (ref 81–99)
MONOCYTES # BLD AUTO: 0.79 10*3/UL (ref 0.2–1.2)
MONOCYTES NFR BLD AUTO: 10.1 % (ref 3–10)
NEUTROPHILS # BLD AUTO: 3.98 10*3/UL (ref 2–8)
NEUTROPHILS NFR BLD AUTO: 50.6 % (ref 30–85)
NITRITE UR QL STRIP: POSITIVE
NRBC CBCN: 0 % (ref 0–0.7)
OSMOLALITY SERPL CALC.SUM OF ELEC: 327 MOSM/KG (ref 273–304)
PH UR STRIP.AUTO: 5 [PH] (ref 5–8)
PHOSPHATE SERPL-MCNC: 5.7 MG/DL (ref 2.4–4.5)
PLATELET # BLD AUTO: 132 10*3/UL (ref 130–400)
PMV BLD AUTO: 10.6 FL (ref 9.4–12.3)
POTASSIUM SERPL-SCNC: 5.2 MMOL/L (ref 3.5–5.3)
PROT UR QL: 30 MG/DL
PROT UR-MCNC: 28.7 MG/DL
PTH-INTACT SERPL-MCNC: 101 PG/ML (ref 11.1–79.5)
RBC # BLD AUTO: 3.33 10*6/UL (ref 4.2–5.4)
RBC #/AREA URNS HPF: ABNORMAL /[HPF]
SODIUM SERPL-SCNC: 142 MMOL/L (ref 135–147)
SP GR UR: 1.01 (ref 1–1.03)
SQUAMOUS SPT QL MICRO: ABNORMAL /[HPF]
TIBC SERPL-MCNC: 285 UG/DL (ref 245–450)
TRANSFERRIN SERPL-MCNC: 199 MG/DL (ref 250–380)
WBC # BLD AUTO: 7.86 10*3/UL (ref 4.8–10.8)
WBC #/AREA URNS HPF: ABNORMAL /[HPF]

## 2020-10-05 ENCOUNTER — HOSPITAL ENCOUNTER (OUTPATIENT)
Dept: OTHER | Facility: HOSPITAL | Age: 85
Discharge: HOME | End: 2020-10-05
Attending: INTERNAL MEDICINE

## 2020-10-05 LAB
ANION GAP SERPL CALC-SCNC: 19 MMOL/L (ref 8–19)
BASOPHILS # BLD AUTO: 0.03 10*3/UL (ref 0–0.2)
BASOPHILS NFR BLD AUTO: 0.4 % (ref 0–3)
BNP SERPL-MCNC: 6594 PG/ML (ref 0–1800)
BUN SERPL-MCNC: 93 MG/DL (ref 5–25)
BUN/CREAT SERPL: 33 {RATIO} (ref 6–20)
CALCIUM SERPL-MCNC: 9 MG/DL (ref 8.7–10.4)
CHLORIDE SERPL-SCNC: 104 MMOL/L (ref 99–111)
CONV ABS IMM GRAN: 0.03 10*3/UL (ref 0–0.2)
CONV CO2: 23 MMOL/L (ref 22–32)
CONV IMMATURE GRAN: 0.4 % (ref 0–1.8)
CREAT UR-MCNC: 2.79 MG/DL (ref 0.5–0.9)
DEPRECATED RDW RBC AUTO: 43.5 FL (ref 36.4–46.3)
EOSINOPHIL # BLD AUTO: 0.24 10*3/UL (ref 0–0.7)
EOSINOPHIL # BLD AUTO: 3.4 % (ref 0–7)
ERYTHROCYTE [DISTWIDTH] IN BLOOD BY AUTOMATED COUNT: 11.9 % (ref 11.7–14.4)
GFR SERPLBLD BASED ON 1.73 SQ M-ARVRAT: 14 ML/MIN/{1.73_M2}
GLUCOSE SERPL-MCNC: 147 MG/DL (ref 65–99)
HCT VFR BLD AUTO: 31.7 % (ref 37–47)
HGB BLD-MCNC: 9.4 G/DL (ref 12–16)
IRON SATN MFR SERPL: 40 % (ref 20–55)
IRON SERPL-MCNC: 100 UG/DL (ref 60–170)
LYMPHOCYTES # BLD AUTO: 1.62 10*3/UL (ref 1–5)
LYMPHOCYTES NFR BLD AUTO: 23 % (ref 20–45)
MCH RBC QN AUTO: 29.6 PG (ref 27–31)
MCHC RBC AUTO-ENTMCNC: 29.7 G/DL (ref 33–37)
MCV RBC AUTO: 99.7 FL (ref 81–99)
MONOCYTES # BLD AUTO: 0.96 10*3/UL (ref 0.2–1.2)
MONOCYTES NFR BLD AUTO: 13.7 % (ref 3–10)
NEUTROPHILS # BLD AUTO: 4.15 10*3/UL (ref 2–8)
NEUTROPHILS NFR BLD AUTO: 59.1 % (ref 30–85)
NRBC CBCN: 0 % (ref 0–0.7)
OSMOLALITY SERPL CALC.SUM OF ELEC: 323 MOSM/KG (ref 273–304)
PLATELET # BLD AUTO: 118 10*3/UL (ref 130–400)
PMV BLD AUTO: 11.6 FL (ref 9.4–12.3)
POTASSIUM SERPL-SCNC: 5 MMOL/L (ref 3.5–5.3)
RBC # BLD AUTO: 3.18 10*6/UL (ref 4.2–5.4)
SODIUM SERPL-SCNC: 141 MMOL/L (ref 135–147)
TIBC SERPL-MCNC: 247 UG/DL (ref 245–450)
TRANSFERRIN SERPL-MCNC: 173 MG/DL (ref 250–380)
WBC # BLD AUTO: 7.03 10*3/UL (ref 4.8–10.8)

## 2020-11-27 ENCOUNTER — HOSPITAL ENCOUNTER (OUTPATIENT)
Dept: OTHER | Facility: HOSPITAL | Age: 85
Discharge: HOME | End: 2020-11-27
Attending: INTERNAL MEDICINE

## 2020-11-27 LAB
ALBUMIN SERPL-MCNC: 3.5 G/DL (ref 3.5–5)
ALBUMIN/GLOB SERPL: 0.9 {RATIO} (ref 1.4–2.6)
ALP SERPL-CCNC: 70 U/L (ref 38–185)
ALT SERPL-CCNC: 10 U/L (ref 10–40)
ANION GAP SERPL CALC-SCNC: 15 MMOL/L (ref 8–19)
APPEARANCE UR: ABNORMAL
AST SERPL-CCNC: 24 U/L (ref 15–50)
BASOPHILS # BLD AUTO: 0.03 10*3/UL (ref 0–0.2)
BASOPHILS NFR BLD AUTO: 0.4 % (ref 0–3)
BILIRUB SERPL-MCNC: 0.32 MG/DL (ref 0.2–1.3)
BILIRUB UR QL: NEGATIVE
BNP SERPL-MCNC: ABNORMAL PG/ML (ref 0–1800)
BUN SERPL-MCNC: 73 MG/DL (ref 5–25)
BUN/CREAT SERPL: 32 {RATIO} (ref 6–20)
CALCIUM SERPL-MCNC: 11.1 MG/DL (ref 8.7–10.4)
CHLORIDE SERPL-SCNC: 104 MMOL/L (ref 99–111)
CHOLEST SERPL-MCNC: 114 MG/DL (ref 107–200)
CHOLEST/HDLC SERPL: 2.2 {RATIO} (ref 3–6)
COLOR UR: YELLOW
CONV ABS IMM GRAN: 0.03 10*3/UL (ref 0–0.2)
CONV BACTERIA: ABNORMAL
CONV CO2: 25 MMOL/L (ref 22–32)
CONV COLLECTION SOURCE (UA): ABNORMAL
CONV HYALINE CASTS IN URINE MICRO: ABNORMAL /[LPF]
CONV IMMATURE GRAN: 0.4 % (ref 0–1.8)
CONV TOTAL PROTEIN: 7.2 G/DL (ref 6.3–8.2)
CONV UROBILINOGEN IN URINE BY AUTOMATED TEST STRIP: 0.2 {EHRLICHU}/DL (ref 0.1–1)
CREAT UR-MCNC: 2.3 MG/DL (ref 0.5–0.9)
DEPRECATED RDW RBC AUTO: 43.9 FL (ref 36.4–46.3)
EOSINOPHIL # BLD AUTO: 0.28 10*3/UL (ref 0–0.7)
EOSINOPHIL # BLD AUTO: 3.8 % (ref 0–7)
ERYTHROCYTE [DISTWIDTH] IN BLOOD BY AUTOMATED COUNT: 12.2 % (ref 11.7–14.4)
GFR SERPLBLD BASED ON 1.73 SQ M-ARVRAT: 18 ML/MIN/{1.73_M2}
GLOBULIN UR ELPH-MCNC: 3.7 G/DL (ref 2–3.5)
GLUCOSE SERPL-MCNC: 104 MG/DL (ref 65–99)
GLUCOSE UR QL: NEGATIVE MG/DL
HCT VFR BLD AUTO: 29.2 % (ref 37–47)
HDLC SERPL-MCNC: 52 MG/DL (ref 40–60)
HGB BLD-MCNC: 9 G/DL (ref 12–16)
HGB UR QL STRIP: NEGATIVE
KETONES UR QL STRIP: NEGATIVE MG/DL
LDLC SERPL CALC-MCNC: 42 MG/DL (ref 70–100)
LEUKOCYTE ESTERASE UR QL STRIP: ABNORMAL
LYMPHOCYTES # BLD AUTO: 2.03 10*3/UL (ref 1–5)
LYMPHOCYTES NFR BLD AUTO: 27.4 % (ref 20–45)
MCH RBC QN AUTO: 30.2 PG (ref 27–31)
MCHC RBC AUTO-ENTMCNC: 30.8 G/DL (ref 33–37)
MCV RBC AUTO: 98 FL (ref 81–99)
MONOCYTES # BLD AUTO: 0.93 10*3/UL (ref 0.2–1.2)
MONOCYTES NFR BLD AUTO: 12.6 % (ref 3–10)
NEUTROPHILS # BLD AUTO: 4.11 10*3/UL (ref 2–8)
NEUTROPHILS NFR BLD AUTO: 55.4 % (ref 30–85)
NITRITE UR QL STRIP: POSITIVE
NRBC CBCN: 0 % (ref 0–0.7)
OSMOLALITY SERPL CALC.SUM OF ELEC: 312 MOSM/KG (ref 273–304)
PH UR STRIP.AUTO: 5 [PH] (ref 5–8)
PHOSPHATE SERPL-MCNC: 3.5 MG/DL (ref 2.4–4.5)
PLATELET # BLD AUTO: 127 10*3/UL (ref 130–400)
PMV BLD AUTO: 10.5 FL (ref 9.4–12.3)
POTASSIUM SERPL-SCNC: 4.4 MMOL/L (ref 3.5–5.3)
PROT UR QL: 100 MG/DL
RBC # BLD AUTO: 2.98 10*6/UL (ref 4.2–5.4)
RBC #/AREA URNS HPF: ABNORMAL /[HPF]
SODIUM SERPL-SCNC: 140 MMOL/L (ref 135–147)
SP GR UR: 1.01 (ref 1–1.03)
SQUAMOUS SPT QL MICRO: ABNORMAL /[HPF]
TRIGL SERPL-MCNC: 102 MG/DL (ref 40–150)
VLDLC SERPL-MCNC: 20 MG/DL (ref 5–37)
WBC # BLD AUTO: 7.41 10*3/UL (ref 4.8–10.8)
WBC #/AREA URNS HPF: ABNORMAL /[HPF]

## 2020-12-04 ENCOUNTER — HOSPITAL ENCOUNTER (OUTPATIENT)
Dept: ONCOLOGY | Facility: HOSPITAL | Age: 85
Discharge: HOME | End: 2020-12-04
Attending: NURSE PRACTITIONER

## 2020-12-04 ENCOUNTER — OFFICE VISIT CONVERTED (OUTPATIENT)
Dept: ONCOLOGY | Facility: HOSPITAL | Age: 85
End: 2020-12-04
Attending: NURSE PRACTITIONER

## 2020-12-07 ENCOUNTER — OFFICE VISIT CONVERTED (OUTPATIENT)
Dept: CARDIOLOGY | Facility: CLINIC | Age: 85
End: 2020-12-07
Attending: INTERNAL MEDICINE

## 2021-05-10 NOTE — H&P
History and Physical      Patient Name: Apolonia Young   Patient ID: [id number]   Sex: Female   YOB: 1926    Primary Care Provider: Elver Byrd MD   Referring Provider: Elver Byrd MD    Visit Date: July 2, 2020    Provider: Yael Silva MD   Location[facility]   Location Address: [address] [place] [address]  [id number]   Location Phone: [phone]          Chief Complaint  · Breast Cancer  · Pre-Surgical History and Physical Examination for [facility]  · Consents for Surgery      History Of Present Illness  Apolonia Yougn is a 89 year old /White female who is referred from Elver Byrd MD ; please see last note for pathology... she has been on hormonal suppression therapy but feels the mass is enlarging in size and further involving the nipple and she was referred back for lumpectomy. I discussed with her we would likely need to excise nipple as well as it feels and appears to be involved with mass.             Past Medical History  Anemia, Unspecified; Aortic stenosis; Aortic valve insufficiency; Arthritis; Basal Cell Carcinoma; Congestive heart failure; Diabetes Mellitus, Type II; Gastric Ulcer; GERD; High blood pressure; Hyperlipidemia; Postoperative Follow-Up Visit; Renal cancer, right; Renal mass; Urinary Incontinence         Past Surgical History  Aortic valve replacement; Lymph Node Surgery; Nephrectomy, right kidney         Medication List  aspirin oral; Bystolic oral; Bystolic 2.5 mg oral tablet; Caduet oral; hydralazine 100 mg oral tablet; hydralazine 50 mg oral tablet; lansoprazole 30 mg oral capsule,delayed release(DR/EC); Lasix 40 mg oral tablet; nitroglycerin 0.4 mg sublingual tablet, sublingual; torsemide 20 mg oral tablet; Vitamin D3 oral         Allergy List  Adhesives; Latex       Allergies Reconciled  Family Medical History  Renal Cancer         Social History  Tobacco (Never)         Review of  "Systems  · Constitutional  o Denies  o : fever, chills  · Breasts  o * See HPI  · Cardiovascular  o Denies  o : chest pain, cardiac murmurs, irregular heart beats, dyspnea on exertion  · Integument  o Denies  o : rash, itching, new skin lesions  · Heme-Lymph  o Denies  o : easy bleeding, easy bruising, lymph node enlargement or tenderness      Vitals  Date Time BP Position Site L\R Cuff Size HR RR TEMP (F) WT  HT  BMI kg/m2 BSA m2 O2 Sat        07/02/2020 10:06 AM       16  155lbs 0oz 5'  3\" 27.46 1.77           Physical Examination  · Constitutional  o Appearance  o : A well-nourished, well-developed patient who ambulates without difficulty, Alert and Oriented X3.  · Respiratory  o Respiratory Effort  o : breathing unlabored  o Inspection of Chest  o : breaths equal bilaterally  · Breasts  o Inspection of Breasts  o : developmental state normal for age  o Palpation of Breasts, Axillae  o : no masses or tenderness noted on palpation on right, left breast with palpable mass just lateral to areola and involving nipple, no other masses palpable, no LAD bulky          Assessment  · Breast Cancer: Upper-outer Quadrant     174.4  · Preoperative Examination     V72.83      Plan  · Orders  o General Surgery Order (GENOR) - - 07/02/2020  o Lymphedema Clinic Consult (Order_PT/OT Consult for Bioimpedence Lymph fluid analysis,Pre-op and retest Post-op, every 3 mo. Yr. 1-3, 6 mo. Yr. 4-5, annually year 6+) (LYMCL) - - 07/02/2020  o [facility] Pre-Op Covid-19 Screening (91752) - 174.4 - 07/10/2020  o General Surgery Order (GENOR) - 174.4 - 07/14/2020  o [place] Node(Left) Identification (Detection) [[id number]] (59278) - 174.4 - 07/14/2020  · Medications  o Medications have been Reconciled  o Transition of Care or Provider Policy  · Instructions  o DISCUSSION:  o PLAN:  o Schedule operation  o ****Surgical Treatment Addendum****  o Breast Conserving Therapy: Offered  o [place] node dissection offfered and will perform as a part of " surgery.  o Reconstructive/Plastice Surgery referral offered prior to surgery and patient declined. Reason: pt does not want   o Handouts Provided-Pre-Procedure Instructions including date and time and location of procedure.  o ****Surgical Orders****  o ****Patient Status****  o RISK AND BENEFITS:  o Consent for surgery: Given these options, the patient has verbally expressed an understanding of the risks of surgery and finds these risks acceptable. We will proceed with surgery as soon as possible.  o Possible risks, complications, benefits, and alternatives to surgical or invasive procedure have been explained to patient and/or legal guardian.  o O.R. PREP: Per protocol  o IV: Per Anesthesia  o Please sign permit for: left lumpectomy and left sentinel node identification and biopsy  o Kefzol 2 grams IV on call to OR.  o The above History and Physical Examination has been completed within 30 days of admission.            Electronically Signed by: Yael Silva MD -Author on July 2, 2020 10:18:41 AM

## 2021-05-10 NOTE — H&P
History and Physical      Patient Name: Apolonia Young   Patient ID: [id number]   Sex: Female   YOB: 1926    Primary Care Provider: Elver Byrd MD   Referring Provider: Elver Byrd MD    Visit Date: July 29, 2020    Provider: Yael Silva MD   Location: [facility]   Location Address: [address] [place] [address]  [id number]   Location Phone: [phone]          Chief Complaint  · Follow Up Surgery      History Of Present Illness  Apolonia Young is a 89 year old /White female who is here today for follow up of: partial mastectomy with sentinel node.   She is doing well-status post surgery. Incision healing very well and she has a consult with Dr Mix to review imaging.       Past Medical History  Anemia, Unspecified; Aortic stenosis; Aortic valve insufficiency; Arthritis; Basal Cell Carcinoma; Congestive heart failure; Diabetes Mellitus, Type II; Gastric Ulcer; GERD; High blood pressure; Hyperlipidemia; Postoperative Follow-Up Visit; Renal cancer, right; Renal mass; Urinary Incontinence         Past Surgical History  Aortic valve replacement; Lumpectomy of left breast; Lymph Node Surgery; Nephrectomy, right kidney         Medication List  amlodipine 5 mg oral tablet; aspirin oral; atorvastatin 10 mg oral tablet; Bystolic oral; Bystolic 2.5 mg oral tablet; Caduet oral; hydralazine 100 mg oral tablet; hydralazine 50 mg oral tablet; lansoprazole 30 mg oral capsule,delayed release(DR/EC); Lasix 40 mg oral tablet; nitroglycerin 0.4 mg sublingual tablet, sublingual; torsemide 20 mg oral tablet; Vitamin D3 oral         Allergy List  Adhesives; Latex       Allergies Reconciled  Family Medical History  Renal Cancer         Social History  Tobacco (Never)         Review of Systems  · Constitutional  o Denies  o : fever, chills  · Eyes  o Denies  o : yellowish discoloration of the eyes, eye pain  · HENT  o Denies  o : difficulty swallowing,  "hoarseness  · Cardiovascular  o Denies  o : chest pain on exertion, irregular heart beats  · Respiratory  o Denies  o : shortness of breath, cough  · Gastrointestinal  o Denies  o : nausea, vomiting, diarrhea, constipation  · Integument  o Admits  o : see HPI for surgical incision healing  · Neurologic  o Denies  o : tingling or numbness, loss of balance  · Musculoskeletal  o Denies  o : joint pain, back pain  · Endocrine  o Denies  o : weight gain, weight loss  · All Others Negative      Vitals  Date Time BP Position Site L\R Cuff Size HR RR TEMP (F) WT  HT  BMI kg/m2 BSA m2 O2 Sat HC       07/29/2020 11:13 AM       16  155lbs 0oz 5'  1\" 29.29 1.74           Physical Examination  · Constitutional  o Appearance  o : well developed/well nourished patient in no apparent distress  · Respiratory  o Inspection of Chest  o : equal breaths bilaterally  · Gastrointestinal  o Abdominal Examination  o : soft/nontender, nondistended, no organomegaly appreciated  · Post Surgical Incision  o Surgical wound  o : healing well, no erythema          Assessment  · Postoperative Exam Following Surgery     V67.00/Z09      Plan  · Medications  o Medications have been Reconciled  o Transition of Care or Provider Policy  · Instructions  o Keep apt with Dr Mix  o Return to office with any issues.            Electronically Signed by: Yael Silva MD -Author on July 29, 2020 11:19:15 AM  "

## 2021-05-10 NOTE — H&P
History and Physical      Patient Name: Apolonia Young   Patient ID: [id number]   Sex: Female   YOB: 1926    Primary Care Provider: Elver Byrd MD   Referring Provider: Elver Byrd MD    Visit Date: July 22, 2020    Provider: Yael Silva MD   Location[facility]   Location Address: [address] [place] [address]  [id number]   Location Phone: [phone]          Chief Complaint  · Follow Up Surgery      History Of Present Illness  Apolonia Young is a 89 year old /White female who is here today for follow up of: partial left mastectomy with nodes.   She is doing well-status post surgery. Pathology was negative margins and 2/2 nodes.       Past Medical History  Anemia, Unspecified; Aortic stenosis; Aortic valve insufficiency; Arthritis; Basal Cell Carcinoma; Congestive heart failure; Diabetes Mellitus, Type II; Gastric Ulcer; GERD; High blood pressure; Hyperlipidemia; Postoperative Follow-Up Visit; Renal cancer, right; Renal mass; Urinary Incontinence         Past Surgical History  Aortic valve replacement; Lumpectomy of left breast; Lymph Node Surgery; Nephrectomy, right kidney         Medication List  amlodipine 5 mg oral tablet; aspirin oral; atorvastatin 10 mg oral tablet; Bystolic oral; Bystolic 2.5 mg oral tablet; Caduet oral; hydralazine 100 mg oral tablet; hydralazine 50 mg oral tablet; lansoprazole 30 mg oral capsule,delayed release(DR/EC); Lasix 40 mg oral tablet; nitroglycerin 0.4 mg sublingual tablet, sublingual; torsemide 20 mg oral tablet; Vitamin D3 oral         Allergy List  Adhesives; Latex       Allergies Reconciled  Family Medical History  Renal Cancer         Social History  Tobacco (Never)         Review of Systems  · Constitutional  o Denies  o : fever, chills  · Eyes  o Denies  o : yellowish discoloration of the eyes, eye pain  · HENT  o Denies  o : difficulty swallowing, hoarseness  · Cardiovascular  o Denies  o : chest pain on exertion,  "irregular heart beats  · Respiratory  o Denies  o : shortness of breath, cough  · Gastrointestinal  o Denies  o : nausea, vomiting, diarrhea, constipation  · Integument  o Admits  o : see HPI for surgical incision healing  · Neurologic  o Denies  o : tingling or numbness, loss of balance  · Musculoskeletal  o Denies  o : joint pain, back pain  · Endocrine  o Denies  o : weight gain, weight loss  · All Others Negative      Vitals  Date Time BP Position Site L\R Cuff Size HR RR TEMP (F) WT  HT  BMI kg/m2 BSA m2 O2 Sat HC       07/22/2020 11:21 AM       16  155lbs 0oz 5'  3\" 27.46 1.77           Physical Examination  · Constitutional  o Appearance  o : well developed/well nourished patient in no apparent distress  · Respiratory  o Inspection of Chest  o : equal breaths bilaterally  · Gastrointestinal  o Abdominal Examination  o : soft/nontender, nondistended, no organomegaly appreciated  · Post Surgical Incision  o Surgical wound  o : healing well, no erythema, drain removed today          Assessment  · Postoperative Exam Following Surgery     V67.00/Z09      Plan  · Medications  o Medications have been Reconciled  o Transition of Care or Provider Policy  · Instructions  o Return to office with any issues.  o Keep apt with oncology  o F/U in 1 weeks.            Electronically Signed by: Yael Silva MD -Author on July 22, 2020 11:35:27 AM  "

## 2021-05-13 NOTE — PROGRESS NOTES
Quick Note      Patient Name: Apolonia Young   Patient ID: [id number]   Sex: Female   YOB: 1926    Primary Care Provider: Elver Byrd MD   Referring Provider: Elver Byrd MD    Visit Date: May 20, 2020    Provider: Darrian Mace MD   Location: [facility]   Location Address: [address] [place] [address], [facility]   [place]ASID[place] Ness  [id number]   Location Phone: [phone]          History Of Present Illness  TELEHEALTH TELEPHONE VISIT  Chief Complaint: Follow up of congestive heart failure and aortic valve stenosis.   Apolonia Young is a 89 year old /White female with congestive heart failure, chronic kidney disease, and status post aortic valve replacement (valve-in-valve procedure) who has done better than before. She had a YVAN done at [facility], which did not reveal any severe prosthetic aortic valve stenosis, and has been recommended to continue medical treatment of her diastolic heart failure. She denies any chest pain, palpitations, dizziness, or syncope. She is short of breath with exertion, but improved. She is presenting for evaluation via telehealth telephone visit. Verbal consent obtained before beginning visit. Telehealth visit due to COVID-19.   Provider spent 6 minutes with the patient during the telehealth visit.   The following staff were present during this visit: Provider only.   PAST MEDICAL HISTORY: Bioprosthetic aortic valve with aortic stenosis; Chronic diastolic heart failure; Chronic kidney disease; Diabetes mellitus; Hyperlipidemia; Hypertension.   FAMILY HISTORY: Positive for diabetes mellitus, hypertension, and heart disease.   PSYCHOSOCIAL HISTORY: Denies alcohol or tobacco use.   CURRENT MEDICATIONS: Lansoprazole 30 mg daily; hydralazine 75 mg b.i.d.; torsemide 40 mg b.i.d.; Bystolic 5 mg daily; atorvastatin 10 mg daily; amlodipine 5 mg daily; montelukast 10 mg daily; aspirin 81 mg daily; cetirizine 10 mg daily; anastrozole 1 mg daily.  Dosage and frequency of the medications reviewed with the patient.       Vitals     Per patient, at-home vitals:   Blood pressure 139/61.  Heart rate 58.       Physical Examination  · Labs  o Labs  o : She was supposed to have blood work done, but did not do so.          Assessment     1.  Aortic valve disease.  2.  Congestive heart failure, chronic, diastolic, class II-III, improved.  3.  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  4.  Hypertension.  5.  Hyperlipidemia.  6.  Diabetes mellitus.       Plan     1.  Discussed with her the need to get her blood work done in the next few days.    2.  Continue home blood pressure monitoring.  3.  Follow up in 6 months.      Darrian Mace MD, [facility]  PM/vm             Electronically Signed by: Darrian Mace MD -Author on June 22, 2020 06:28:15 PM

## 2021-05-13 NOTE — PROGRESS NOTES
Progress Note      Patient Name: Apolonia Young   Patient ID: [id number]   Sex: Female   YOB: 1926    Primary Care Provider: Elver Byrd MD   Referring Provider: Elver Byrd MD    Visit Date: December 7, 2020    Provider: Darrian Mace MD   Location: [facility]   Location Address: [address] [place] [address], [address]   [place]  [id number]   Location Phone: [phone]          Chief Complaint     Shortness of breath.  Elevated blood pressures.       History Of Present Illness  REFERRING PROVIDER: Elver Byrd MD   Apolonia Young is a 89 year old /White female who continues to be short of breath. It is moderate even with mild exertion. She is on O2 at 3 liters now for the past year. She is also complaining of complete weakness and has not been able to walk in the last month. Denies any chest pain. No palpitations, swelling, dizziness, syncope, PND, or orthopnea. Since she was last here, she has had left breast removed. She is also being monitored for a spot on the lungs and the liver, but she declines to have any treatment for it.   PAST MEDICAL HISTORY: Bioprosthetic aortic valve with aortic stenosis; Chronic diastolic heart failure; Chronic kidney disease; Diabetes mellitus; Hyperlipidemia; Hypertension.   PSYCHOSOCIAL HISTORY: Denies tobacco use. Denies alcohol use.   CURRENT MEDICATIONS: Vitamin C 500 mg daily; montelukast 10 mg daily; torsemide 20 mg b.i.d.; lansoprazole 30 mg every other day; tamoxifen 20 g daily; Bystolic 2.5 mg daily; cetirizine 10 mg daily; amlodipine 5 mg daily; hydralazine 100 mg b.i.d.; atorvastatin 10 mg q. h.s.; ferrous sulfate 325 mg daily; Colace 100 mg p.r.n.; aspirin 81 mg daily.      ALLERGIES:  Adhesives; Latex.       Review of Systems  · Cardiovascular  o Admits  o : shortness of breath while walking or lying flat  o Denies  o : palpitations (fast, fluttering, or skipping beats), swelling (feet, ankles, hands), chest pain or angina  "pectoris   · Respiratory  o Denies  o : chronic or frequent cough      Vitals  Date Time BP Position Site L\R Cuff Size HR RR TEMP (F) WT  HT  BMI kg/m2 BSA m2 O2 Sat FR L/min FiO2 HC       12/07/2020 01:23 /62 Sitting    56 - R   147lbs 16oz 5'  1\" 27.96 1.7       12/07/2020 01:23 /78 Sitting                       Physical Examination  · Constitutional  o Appearance  o : Awake, alert, in no acute distress, on O2 continuously, accompanied by family, in a wheelchair.  · Eyes  o Conjunctivae  o : Conjunctivae normal.  · Ears, Nose, Mouth and Throat  o Oral Cavity  o :   § Oral Mucosa  § : Normal.  · Neck  o Jugular Veins  o : No JVD. Good carotid upstroke. No bruits noted.  · Respiratory  o Respiratory  o : Good respiratory effort. Clear to percussion and auscultation.  · Cardiovascular  o Heart  o : PMI not well felt. S1, S2 regular. No S3. No S4. 2/6 systolic murmur at the base. Negative diastolic murmur.  o Peripheral Vascular System  o :   § Extremities  § : Good femoral and pedal pulses. No pedal edema.  · Gastrointestinal  o Abdominal Examination  o : Soft. No tenderness or masses felt. No hepatosplenomegaly. Abdominal aorta is not palpable.  · Labs  o Labs  o : Hemoglobin 9, hematocrit is 29.2 and that is unchanged for her. BUN 73, creatinine 2.3. Total cholesterol 114, triglycerides 102, HDL 52, LDL 42. BNP 46984.           Assessment     1.  Shortness of breath.  2.  Overall weakness.  3.  Bioprosthetic aortic valve with aortic stenosis.  4.  Chronic diastolic heart failure.  5.  Chronic kidney disease, stage 4, stable.  6.  Hyperlipidemia, controlled, may be too tightly controlled.  7.  Hypertension, needs tighter control.       Plan     1.  Hold atorvastatin for 2 weeks and then take one every other day to see if it helps her overall weakness.  2.  Increase hydralazine to 100 mg t.i.d. for tighter control of her blood pressure.  3.  She will do a blood pressure log, and we will adjust " hypertensive medications if needed.  4.  Kidney management per Dr. Lewis.    5.  Can take torsemide 20 mg 3 tabs a day for 2-3 days for increasing pedal edema and then back to the        regular dose.    6.  Follow up in 6 months with labs or earlier if needed.      GHULAM Solitario/Darrian Mace MD, FACC  JF:PM:vm               Electronically Signed by: Alice Cherry-, Other -Author on December 14, 2020 07:53:49 AM  Electronically Co-signed by: GHULAM Srinivasan -Reviewer on December 14, 2020 03:53:18 PM  Electronically Co-signed by: Darrian Mace MD -Reviewer on December 18, 2020 07:38:15 PM

## 2021-05-14 VITALS
WEIGHT: 148 LBS | SYSTOLIC BLOOD PRESSURE: 182 MMHG | HEIGHT: 61 IN | DIASTOLIC BLOOD PRESSURE: 62 MMHG | HEART RATE: 56 BPM | BODY MASS INDEX: 27.94 KG/M2

## 2021-05-15 VITALS
HEART RATE: 56 BPM | DIASTOLIC BLOOD PRESSURE: 70 MMHG | BODY MASS INDEX: 29.45 KG/M2 | HEIGHT: 61 IN | WEIGHT: 156 LBS | SYSTOLIC BLOOD PRESSURE: 160 MMHG

## 2021-05-15 VITALS — HEIGHT: 63 IN | WEIGHT: 155 LBS | BODY MASS INDEX: 27.46 KG/M2 | RESPIRATION RATE: 16 BRPM

## 2021-05-15 VITALS — BODY MASS INDEX: 27.46 KG/M2 | RESPIRATION RATE: 16 BRPM | WEIGHT: 155 LBS | HEIGHT: 63 IN

## 2021-05-15 VITALS — BODY MASS INDEX: 29.27 KG/M2 | RESPIRATION RATE: 16 BRPM | HEIGHT: 61 IN | WEIGHT: 155 LBS

## 2021-05-15 VITALS
SYSTOLIC BLOOD PRESSURE: 166 MMHG | WEIGHT: 147 LBS | DIASTOLIC BLOOD PRESSURE: 46 MMHG | HEIGHT: 61 IN | HEART RATE: 52 BPM | BODY MASS INDEX: 27.75 KG/M2

## 2021-05-15 VITALS — WEIGHT: 155 LBS | BODY MASS INDEX: 27.46 KG/M2 | HEIGHT: 63 IN | RESPIRATION RATE: 14 BRPM

## 2021-05-16 VITALS
DIASTOLIC BLOOD PRESSURE: 74 MMHG | SYSTOLIC BLOOD PRESSURE: 178 MMHG | HEART RATE: 56 BPM | WEIGHT: 161 LBS | HEIGHT: 61 IN | BODY MASS INDEX: 30.4 KG/M2

## 2021-05-16 VITALS
HEIGHT: 61 IN | SYSTOLIC BLOOD PRESSURE: 144 MMHG | DIASTOLIC BLOOD PRESSURE: 64 MMHG | BODY MASS INDEX: 30.96 KG/M2 | WEIGHT: 164 LBS

## 2021-05-16 VITALS
DIASTOLIC BLOOD PRESSURE: 56 MMHG | SYSTOLIC BLOOD PRESSURE: 140 MMHG | BODY MASS INDEX: 30.58 KG/M2 | WEIGHT: 162 LBS | HEIGHT: 61 IN | HEART RATE: 54 BPM

## 2021-05-16 VITALS
BODY MASS INDEX: 30.78 KG/M2 | HEART RATE: 50 BPM | SYSTOLIC BLOOD PRESSURE: 148 MMHG | DIASTOLIC BLOOD PRESSURE: 60 MMHG | WEIGHT: 163 LBS | HEIGHT: 61 IN

## 2021-05-16 VITALS
HEIGHT: 61 IN | HEART RATE: 50 BPM | BODY MASS INDEX: 29.45 KG/M2 | DIASTOLIC BLOOD PRESSURE: 72 MMHG | WEIGHT: 156 LBS | SYSTOLIC BLOOD PRESSURE: 156 MMHG

## 2021-05-28 VITALS
TEMPERATURE: 98.8 F | BODY MASS INDEX: 28.6 KG/M2 | OXYGEN SATURATION: 98 % | RESPIRATION RATE: 20 BRPM | WEIGHT: 151.46 LBS | HEART RATE: 68 BPM | DIASTOLIC BLOOD PRESSURE: 46 MMHG | HEIGHT: 61 IN | SYSTOLIC BLOOD PRESSURE: 144 MMHG

## 2021-05-28 VITALS
DIASTOLIC BLOOD PRESSURE: 44 MMHG | RESPIRATION RATE: 20 BRPM | SYSTOLIC BLOOD PRESSURE: 157 MMHG | HEART RATE: 61 BPM | WEIGHT: 149.47 LBS | BODY MASS INDEX: 28.24 KG/M2 | OXYGEN SATURATION: 97 % | TEMPERATURE: 97.2 F

## 2021-05-28 NOTE — PROGRESS NOTES
Patient: SOPHIA BEYER     Acct: [id number]     Report: #[id number]  UNIT #: [id number]     : 10/20/1926    Encounter Date:2020  PRIMARY CARE: ELVER BYRD  ***Signed***  --------------------------------------------------------------------------------------------------------------------  TELEHEALTH NOTE      History of Present Illness            Chief Complaint: (BREAST CANCER)            Sophia Beyer is presenting for evaluation via Telehealth visit by     phone. Verbal consent obtained before beginning visit.            Provider spent (8) minutes with the patient during telehealth visit.            The following staff were present during the visit: (Rd Fregoso, FREDDIE)                         Past Med History      Ms. Beyer is a 89-year-old patient referred here by her primary care     provider, Dr. Elver Byrd for new diagnosis of breast cancer.            I saw the patient in conjunction with Dr. Yael Gan, the breast cancer     surgeon.  She and I discussed the case amongst ourselves and with the patient     and her family member.  The patient states that she does self breast exams     nearly every month and noticed a palpable mass in her left breast approximately     2 months ago.  She initially had a screening mammogram in February and then went    on to have a diagnostic mammogram and ultrasound.  Mammogram showed a breast     mass that is 1.8 x 1.4 x 1.6 cm and spiculated.  There is also a left axillary     lymph node measuring 1.1 cm with cortical thickening.  The patient had a biopsy     of the breast mass only.  This was found to be invasive ductal carcinoma, grade     3, estrogen receptor positive (95%, strong), progesterone receptor positive     (10%, strong) HER-2 negative (0% by IHC), Ki-67 70%.            The patient's health is compromised by significant aortic stenosis.  She has had    prior valve repair.  She was recently seen by Dr. Mace for this  reason after     she was hospitalized for acute diastolic heart failure.              Due to concerns over spread of the coronavirus a decision was made to start the     patient on anastrozole prior to surgery.  She started anastrozole in March 2020.     She has not noticed any significant change in the breast mass and proceeded to     lumpectomy on 7/14/2020.  Pathology revealed a 4 cm tumor, grade 3.  2 lymph     nodes were involved with cancer. It appears the mass grew in size prior to     surgery despite AI therapy.              DEXA scan on 6/24/2020: Osteopenia      Overview of Symptoms      I called the patient to discuss the results of pathology after her lumpectomy.      Unfortunately the pathology revealed a 4 cm tumor which indicates that likely     grew prior to surgery and while she was on the aromatase inhibitor.  I explained    to her that if she were younger and in better health I would definitely suggest     Oncotype testing would likely recommend chemotherapy.  However, given the     patient's age and severe comorbidities she and I both agreed this would not be     beneficial.  Given that the patient has osteopenia but not barbara osteoporosis     she may still benefit to some degree from remaining on the aromatase inhibitor.     She is tolerating it very well and has no significant side effects.  She     tolerated the surgery well also.  She says they took the drains out yesterday.      She has no significant pain.            Allergies/Medications      Allergies:        Coded Allergies:             LATEX (Verified  Allergy, Unknown, RASH, 7/23/20)           NO KNOWN DRUG ALLERGIES (Verified  Allergy, Unknown, 7/23/20)      Medications    Last Reconciled on 7/29/20 21:29 by ADRIANE LEGER      HYDROcodone-Acetaminophen 5-325 Mg (HYDROcodone-Acetaminophen 5-325 Mg) 1 Each     Tablet      2 TAB PO Q4H PRN for PAIN, #30 TAB         Prov: TOMER FONG MD         7/14/20       amLODIPine  (amLODIPine) 5 Mg Tablet      5 MG PO HS, #30 TAB         Reported         7/14/20       hydrALAZINE HCL (hydrALAZINE HCL) 50 Mg Tablet      50 MG PO BID, #60 TAB 0 Refills         Reported         7/8/20       hydrALAZINE HCL (hydrALAZINE HCL) 25 Mg Tablet      25 MG PO BID, #60 TAB 0 Refills         Reported         7/8/20       Anastrozole (Anastrozole) 1 Mg Tablet      1 MG PO QDAY, #30 TAB 3 Refills         Prov: AFRICAADRIANE         6/26/20       Lansoprazole (Lansoprazole) 30 Mg Capsule.dr      30 MG PO QDAY, CAP         Reported         3/19/20       Torsemide (Torsemide*) 20 Mg Tablet      40 MG PO BID, TAB         Reported         3/19/20       Montelukast Sodium (Montelukast*) 10 Mg Tablet      10 MG PO HS for 30 Days, #30 TAB         Prov: ToniMarino         8/5/19       Atorvastatin (Atorvastatin) 10 Mg Tablet      10 MG PO HS, #30 TAB 5 Refills         Prov: DARVIN WHATLEY         8/5/19       Nebivolol HCl (BYSTOLIC) 5 Mg Tablet      5 MG PO HS, #30 TAB 5 Refills         Prov: DARVIN WHATLEY         8/5/19       Docusate Sodium (Colace) 100 Mg Capsule      100 MG PO HS PRN for CONSTIPATION, #30 CAP 0 Refills         Reported         3/20/18       Aspirin EC (Aspirin EC) 81 Mg Tabec      81 MG PO QDAY, #30 TAB.EC 0 Refills         Reported         10/16/14            Plan/Instructions      Ambulatory Assessment/Plan:        History of breast cancer - Z85.3            Notes      New Diagnostics      * CT ABD/Pelvis/Chest WO [facility], 2 Week         Dx: History of breast cancer - Z85.3      Plan/Instructions            * Plan Of Care: (Hormone receptor positive breast cancer)      * Patient has high-grade, ER positive/HI negative/HER-2 negative breast cancer       that was treated with neoadjuvant anastrozole prior to surgery.  At the time       of surgery the mass appeared to be larger in size.  I explained to the patient      that she would likely benefit from chemotherapy but given her age and        functional status we both agreed this would not be beneficial overall.  She       remain on the aromatase inhibitor since she is tolerating it well.  She will       get a repeat CT of the chest, abdomen, and pelvis without contrast to follow-      up on the pulmonary nodule previously seen on her CT in May.  I will follow-up      with her by phone after the CT.            * Chronic conditions reviewed and taken into consideration for today's treatment      plan.      * Patient instructed to seek medical attention urgently for new or worsening       symptoms.      * Patient was educated/instructed on their diagnosis, treatment and medications       prior to discharge from the clinic today.      Codes:  Phone Eval 5-10 min [id number]            Electronically signed by ADRIANE LEGER  07/29/2020 21:29       Disclaimer: Converted document may not contain table formatting or lab diagrams. Please see [facility] for the authenticated document.

## 2021-05-28 NOTE — PROGRESS NOTES
Patient: SOPHIA BEYER     Acct: [id number]     Report: #[id number]  UNIT #: [id number]     : 10/20/1926    Encounter Date:2020  PRIMARY CARE: YAA BYRD  ***Signed***  --------------------------------------------------------------------------------------------------------------------  NURSE INTAKE      Visit Type      Established Patient Visit            Chief Complaint      BREAST CANCER            Referring Provider/Copies To      Primary Care Provider:  YAA BYRD      Copies To:   YAA BYRD            History and Present Illness      Past Oncology Illness History      Ms. Beyer is a 89-year-old patient referred here by her primary care     provider, Dr. Yaa Byrd for new diagnosis of breast cancer.            I saw the patient in conjunction with Dr. Yael Gan, the breast cancer     surgeon.  She and I discussed the case amongst ourselves and with the patient     and her family member.  The patient states that she does self breast exams     nearly every month and noticed a palpable mass in her left breast approximately     2 months ago.  She initially had a screening mammogram in February and then went    on to have a diagnostic mammogram and ultrasound.  Mammogram showed a breast     mass that is 1.8 x 1.4 x 1.6 cm and spiculated.  There is also a left axillary     lymph node measuring 1.1 cm with cortical thickening.  The patient had a biopsy     of the breast mass only.  This was found to be invasive ductal carcinoma, grade     3, estrogen receptor positive (95%, strong), progesterone receptor positive (10%    , strong) HER-2 negative (0% by IHC), Ki-67 70%.            The patient's health is compromised by significant aortic stenosis.  She has had    prior valve repair.  She was recently seen by Dr. Mace for this reason after     she was hospitalized for acute diastolic heart failure.              Due to concerns over spread of the coronavirus a decision was  made to start the     patient on anastrozole prior to surgery.  She started anastrozole in March 2020.     She has not noticed any significant change in the breast mass and proceeded to     lumpectomy on 7/14/2020.  Pathology revealed a 4 cm tumor, grade 3.  2 lymph     nodes were involved with cancer. It appears the mass grew in size prior to     surgery despite AI therapy.              DEXA scan on 6/24/2020: Osteopenia      Overview of Symptoms      8/13/20)      I called the patient to discuss the results of pathology after her lumpectomy.      Unfortunately the pathology revealed a 4 cm tumor which indicates that likely     grew prior to surgery and while she was on the aromatase inhibitor.  I explained    to her that if she were younger and in better health I would definitely suggest     Oncotype testing would likely recommend chemotherapy.  However, given the     patient's age and severe comorbidities she and I both agreed this would not be     beneficial.  Given that the patient has osteopenia but not barbara osteoporosis     she may still benefit to some degree from remaining on the aromatase inhibitor.     She is tolerating it very well and has no significant side effects.  She tolera    kate the surgery well also.  She says they took the drains out yesterday.  She     has no significant pain.            HPI - Oncology Interim      1) Left breast cancer: diagnosed (2/20/20) Invasive ductal carcinoma: grade 3:     ER, [place] +, Her 2neg, Ki-67: 70%.             Treatment:             Initated on Anastrozole in March 2020      No change in size of breast mass while taking Anastrozole. Switch to Tamoxifen.       Completed left lumpectomy 7/14/20      CT: pulmonary masses likely representing metastatic disease, new hepatic met.     (8/6/20)      Calcium level 11.1. (11/27/20)            Mrs. Apolonia Young presents for follow up. She presents in WC with her     caregiver. History of metastatic left breast cancer  diagnosed in February of 2020. She was started on Anastrozole and progressed on AI therapy. She completed    left lumpectomy. It decided with patient and Dr. Mix to not have aggressive     treatment for the breast cancer. She completed staging and was now has liver and    lung mets. There are labs in computer with elevated calcium from 11/27/20.      History of endstage CHF and chronic renal failure. Recent labs done on 11/27/20.    She was noted to have elevated calcium up to 11.1.             Her care giver does report she will be seeing Dr. Mace and Dr. Hansen in the     coming weeks and will be discussing palliative care options. The patient,     caregiver, and I did discuss palliative care and its goals of therapy.             2) Hypercalcemia:             Calcium level up to 11.1. Denies any confusion, constipation, bone pain.            ECOG Performance Status      1            Most Recent Lab Findings      Laboratory Tests      11/27/20 07:57            PAST, FAMILY   Past Medical History      Past Medical History:  Diabetes Type 2, Hypertension, Kidney Disease      Other PMH:        Diastolic heart failure, CKD stage III-IV, hypertension, aortic valve      repair, atrial fibrillation      Hematology/Oncology (F):  Anemia, Breast Cancer, Kidney Cancer, Skin Cancer            Past Surgical History      Cataract Surgery, Valve Replacement            SKIN CANCER REMOVAL            Family History      Family History:  GI Canger, Kidney Cancer, Lung Cancer            Social History      Lives independently:  No (LIVES WITH HER DAUGHTER)      Number of Children:  6            Tobacco Use      Tobacco status:  Never smoker            Substance Use      Substance use:  Denies use            REVIEW OF SYSTEMS      General:  Admits: Appetite Change, Fatigue, Weight Loss;          Denies: Fever, Night Sweats, Weight Gain      Other      NO APPETITE      Eye:  Denies Blurred Vision, Denies Corrective Lenses,  Denies Diplopia, Denies     Vision Changes      ENT:  Denies Headache, Denies Hearing Loss, Denies Hoarseness, Denies Sore     Throat      Cardiovascular:  Denies Chest Pain, Denies Palpitations      Respiratory:  Denies: Cough, Coughing Blood, Productive Cough, Shortness of Air,    Wheezing      Gastrointestinal:  Denies Bloody Stools, Denies Constipation, Denies Diarrhea,     Denies Nausea/Vomiting, Denies Problem Swallowing, Denies Unable to Control     Bowels      Genitourinary:  Denies Blood in Urine, Denies Incontinence, Denies Painful Urin    ation      Musculoskeletal:  Denies Back Pain; Admits Muscle Pain, Admits Painful Joints      Other      PAIN IN ANKLES      Integumentary:  Denies Itching, Denies Lesions, Denies Rash      Neurologic:  Denies Dizziness, Denies Numbness\Tingling, Denies Seizures      Psychiatric:  Denies Anxiety, Denies Depression      Endocrine:  Denies Cold Intolerance, Denies Heat Intolerance      Hematologic/Lymphatic:  Denies Bruising, Denies Bleeding, Denies Enlarged Lymph     Nodes      Reproductive:  Denies: Menopause, Heavy Periods, Pregnant, Still Menstruating            VITAL SIGNS AND SCORES      Vitals      Weight 149 lbs 7.550 oz / 67.8 kg      Temperature 97.2 F / 36.22 C - Temporal      Pulse 61      Respirations 20      Blood Pressure 157/44 Sitting, Right Arm      Pulse Oximetry 97%, O2 CONCENTRATOR            Pain Score      Experiencing any pain?:  Yes      Pain Scale Used:  Numerical      Pain Intensity:  6            Fatigue Score      Experiencing any fatigue?:  Yes      Fatigue (0-10 scale):  9            EXAM      General appearance:  in no apparent distress, cooperative, appears stated age.      HEENT: No pallor, no icterus, oral mucosa moist      Neck: Supple, trachea central-not deviated      Lymph nodes: none palpable peripherally      Cardiovascular: S1-S2 heard, no murmurs, no rubs, no gallops.      Breast exam:      Respiratory: Clear to auscultation  bilaterally, no adventitious sounds      Abdomen/gastro: Soft, nontender, no palpable hepatosplenomegaly, bowel sounds     heard      Skin: No lesions, no rashes, no petechiae.      Extremities: No pedal edema, peripheral pulses felt, no clubbing      Musculoskeletal: Normal tone, no rigidity of muscles; range of motion intact      Spine: No deformities      Psychiatric: Alert and oriented x3, appropriate affect, intact judgment      Neurologic: Cranial nerves II through XII grossly intact, no gross neurological     deficits noted.            PREVENTION      Hx Influenza Vaccination:  Yes      Date Influenza Vaccine Given:  Nov 2, 2020      2 or More Falls in Past Year?:  Yes (NO HOSPITAL VISIT- HURT LEFT SHOULDER PAIN)      Fall Past Year with Injury?:  No      Encouraged to follow-up with:  PCP regarding preventative exams.      Chart initiated by      ZAIRA SCHAFFER MA            ALLERGY/MEDS      Allergies      Coded Allergies:             LATEX (Verified  Allergy, Unknown, RASH, 12/4/20)           NO KNOWN DRUG ALLERGIES (Verified  Allergy, Unknown, 12/4/20)            Medications      Last Reconciled on 12/4/20 14:36 by LY HARDY      Tamoxifen Citrate (Tamoxifen*) 20 Mg Tab      20 MG PO QDAY, #90 TAB 3 Refills         Prov: LY HARDY onc         12/4/20       HYDROcodone-Acetaminophen 5-325 Mg (HYDROcodone-Acetaminophen 5-325 Mg) 1 Each     Tablet      2 TAB PO Q4H PRN for PAIN, #30 TAB         Prov: TOMER FONG MD         7/14/20       amLODIPine (amLODIPine) 5 Mg Tablet      5 MG PO HS, #30 TAB         Reported         7/14/20       hydrALAZINE HCL (hydrALAZINE HCL) 50 Mg Tablet      50 MG PO BID, #60 TAB 0 Refills         Reported         7/8/20       hydrALAZINE HCL (hydrALAZINE HCL) 25 Mg Tablet      25 MG PO BID, #60 TAB 0 Refills         Reported         7/8/20       Lansoprazole (Lansoprazole) 30 Mg Capsule.dr      30 MG PO QDAY, CAP         Reported         3/19/20        Torsemide (Torsemide*) 20 Mg Tablet      40 MG PO BID, TAB         Reported         3/19/20       Montelukast Sodium (Montelukast*) 10 Mg Tablet      10 MG PO HS for 30 Days, #30 TAB         Prov: Marino Muñoz         8/5/19       Atorvastatin (Atorvastatin) 10 Mg Tablet      10 MG PO HS, #30 TAB 5 Refills         Prov: DARVIN WHATLEY         8/5/19       Nebivolol HCl (BYSTOLIC) 5 Mg Tablet      5 MG PO HS, #30 TAB 5 Refills         Prov: DARVIN WHATLEY         8/5/19       Docusate Sodium (Colace) 100 Mg Capsule      100 MG PO HS PRN for CONSTIPATION, #30 CAP 0 Refills         Reported         3/20/18       Aspirin EC (Aspirin EC) 81 Mg Tabec      81 MG PO QDAY, #30 TAB.EC 0 Refills         Reported         10/16/14      Medications Reviewed:  No Changes made to meds            IMPRESSION/PLAN      Impression      1) Left breast cancer: diagnosed (2/20/20) Invasive ductal carcinoma: grade 3:     ER, [place] +, Her 2neg, Ki-67: 70%.             Treatment:             Initated on Anastrozole in March 2020      No change in size of breast mass while taking Anastrozole. Switch to Tamoxifen.       Completed left lumpectomy 7/14/20      CT: pulmonary masses likely representing metastatic disease, new hepatic met.     (8/6/20)      Calcium level 11.1. (11/27/20)            Mrs. Apolonia Young presents for follow up. She presents in WC with her     caregiver. History of metastatic left breast cancer diagnosed in February of 2020. She was started on Anastrozole and progressed on AI therapy. She completed    left lumpectomy. It decided with patient and Dr. Mix to not have aggressive     treatment for the breast cancer. She completed staging and was now has liver and    lung mets. There are labs in computer with elevated calcium from 11/27/20.      History of endstage CHF and chronic renal failure. Recent labs done on 11/27/20.    She was noted to have elevated calcium up to 11.1.             Her care giver does report  she will be seeing Dr. Mace and Dr. Hansen in the     coming weeks and will be discussing palliative care options. The patient,     caregiver, and I did discuss palliative care and its goals of therapy. At this     time, patient and caregiver reports she does not want any aggressive therapy     completed for the metastatic breast cancer.             2) Hypercalcemia:             Calcium level up to 11.1. Denies any confusion, constipation, bone pain.     Discussed lab work with Dr. Mix. She does encourage the patient be set up for    a Zometa infusion to bring down the Calcium level so as to avoid any     complications associated with the hypercalcemia.            Diagnosis      Notes      New Medications      * Tamoxifen Citrate (Tamoxifen*) 20 MG TAB: 20 MG PO QDAY #90      Discontinued Medications      * Tamoxifen Citrate (Tamoxifen*) 20 MG TAB: 20 MG PO QDAY 30 Days #30            Plan      1) Refill Tamoxifen.             2) Set up for Zometa infusion if patient is willing to do for hypercalcemia.            Follow up with additional consults with cardiology and nephrology in the coming     weeks.             Follow PRN.            Pain      Pain Zero Today            Advanced Care Plan Discussion      Declines Discussion [id number]            Patient Education            Hypercalcemia      Zoledronic Acid Injection      Patient Education Provided:  Yes            Electronically signed by LY HARDY onc  12/04/2020 14:36       Disclaimer: Converted document may not contain table formatting or lab diagrams. Please see [facility] for the authenticated document.

## 2021-05-28 NOTE — PROGRESS NOTES
Patient: SOPHIA BEYER     Acct: [id number]     Report: #[id number]  UNIT #: [id number]     : 10/20/1926    Encounter Date:2020  PRIMARY CARE: ELVER BYRD  ***Signed***  --------------------------------------------------------------------------------------------------------------------  TELEHEALTH NOTE      History of Present Illness            Chief Complaint: (BREAST CANCER)            Sophia Beyer is presenting for evaluation via Telehealth visit by     phone. Verbal consent obtained before beginning visit.            Provider spent (7) minutes with the patient during telehealth visit.            The following staff were present during the visit: (Rd Fregoso, FREDDIE)                         Past Med History      Ms. Beyer is a 89-year-old patient referred here by her primary care     provider, Dr. Elver Byrd for new diagnosis of breast cancer.            I saw the patient in conjunction with Dr. Yael Gan, the breast cancer     surgeon.  She and I discussed the case amongst ourselves and with the patient     and her family member.  The patient states that she does self breast exams     nearly every month and noticed a palpable mass in her left breast approximately     2 months ago.  She initially had a screening mammogram in February and then went    on to have a diagnostic mammogram and ultrasound.  Mammogram showed a breast     mass that is 1.8 x 1.4 x 1.6 cm and spiculated.  There is also a left axillary     lymph node measuring 1.1 cm with cortical thickening.  The patient had a biopsy     of the breast mass only.  This was found to be invasive ductal carcinoma, grade     3, estrogen receptor positive (95%, strong), progesterone receptor positive     (10%, strong) HER-2 negative (0% by IHC), Ki-67 70%.            The patient's health is compromised by significant aortic stenosis.  She has had    prior valve repair.  She was recently seen by Dr. Mace for this  reason after     she was hospitalized for acute diastolic heart failure.  He has her already     optimized with medical management but does not plan to do surgery.  He indicated    to Dr. Gan that she would be at moderate risk for death or complications    due to breast surgery.            Due to concerns over spread of the coronavirus a decision was made to start the     patient on anastrozole prior to surgery.  She will stay on this for 3 to 6     months and then proceed with lumpectomy at a later date.      Overview of Symptoms      Patient is here for follow-up after starting anastrozole 3 months ago.  She says    she is tolerating the medication well and has had no significant side effects.      She is unable to say whether the breast mass is smaller in size but she does     feel like it is somewhat smaller and closer to the surface.  She said she would     now like to proceed with lumpectomy and asked that we help her get back in touch    with Dr. Gan.            She reports no new concerns but she did fall at home last week and went to the     emergency room.  Fortunately she only bruised her shoulder and is now feeling     much better.            Allergies/Medications      Allergies:        Coded Allergies:             LATEX (Verified  Allergy, Unknown, RASH, 6/26/20)      Uncoded Allergies:             SEASONAL ALLERGIES (Allergy, Unknown, ITCHY EYES, RUNNY NOSE, 10/16/14)      Medications    Last Reconciled on 6/26/20 16:27 by ADRIANE LEGER      Anastrozole (Anastrozole) 1 Mg Tablet      1 MG PO QDAY, #30 TAB 3 Refills         Prov: ADRIANE LEGER         6/26/20       Lansoprazole (Lansoprazole) 30 Mg Capsule.      30 MG PO QDAY, CAP         Reported         3/19/20       Docusate Sodium (Colace) 100 Mg Capsule      PO HS PRN for CONSTIPATION, #30 CAP 0 Refills         Reported         3/19/20       Torsemide (Torsemide*) 20 Mg Tablet      40 MG PO BID, TAB         Reported          3/19/20       Torsemide (Torsemide*) 20 Mg Tablet      40 MG PO BID for 30 Days, #120 TAB 5 Refills         Prov: Marino Muñoz         8/5/19       hydrALAZINE HCL (hydrALAZINE HCL) 25 Mg Tablet      50 MG PO BID for 30 Days, #120 TAB 5 Refills         Prov: Marino Muñoz         8/5/19       Montelukast Sodium (Montelukast*) 10 Mg Tablet      10 MG PO HS for 30 Days, #30 TAB         Prov: Marino Muñoz         8/5/19       amLODIPine (amLODIPine) 5 Mg Tablet      5 MG PO QDAY, #30 TAB 5 Refills         [facility]         8/5/19       Atorvastatin (Atorvastatin) 10 Mg Tablet      10 MG PO HS, #30 TAB 5 Refills         [facility]         8/5/19       Nebivolol HCl (BYSTOLIC) 5 Mg Tablet      5 MG PO HS, #30 TAB 5 Refills         [facility]         8/5/19       Docusate Sodium (Colace) 100 Mg Capsule      100 MG PO HS PRN for CONSTIPATION, #30 CAP 0 Refills         Reported         3/20/18       Lansoprazole (Prevacid) 30 Mg Capsule.dr      30 MG PO QDAY, CAP         Reported         10/16/14       Aspirin EC (Aspirin EC) 81 Mg Tabec      81 MG PO QDAY, #30 TAB.EC 0 Refills         Reported         10/16/14       Cholecalciferol (Vitamin D3*) 2,000 U Tablet      2000 IU PO QPM         Reported         2/21/12            Plan/Instructions      Ambulatory Assessment/Plan:        Breast cancer - C50.919            Notes      Renewed Medications      * Anastrozole 1 MG TABLET: 1 MG PO QDAY #30      New Referrals      * Surgery, Routine         TOMER FONG MD with [facility]         Dx: Breast cancer - C50.919      Plan/Instructions            * Plan Of Care: (Hormone receptor positive breast cancer)      * Patient has a left-sided hormone receptor positive breast cancer with high Ki-      67, 70%, grade 3.  She has been on anastrozole for 3 months because she wanted      to delay surgery due to coronavirus.  She reports no significant changes in       the breast mass and  would like to proceed with surgery now.  I will refer the       patient back to Dr. Gan for evaluation.  I will follow-up with her by       phone in 1 month.  We will discuss ordering a bone density scan at the next       appointment.            * Chronic conditions reviewed and taken into consideration for today's treatment      plan.      * Patient instructed to seek medical attention urgently for new or worsening       symptoms.      * Patient was educated/instructed on their diagnosis, treatment and medications       prior to discharge from the clinic today.      Codes:  Phone Eval 5-10 min [id number]            Electronically signed by ADRIANE LEGER  06/26/2020 16:27       Disclaimer: Converted document may not contain table formatting or lab diagrams. Please see [employer] for the authenticated document.

## 2021-05-28 NOTE — PROGRESS NOTES
Patient: SOPHIA BEYER     Acct: [id number]     Report: #[id number]  UNIT #: [id number]     : 10/20/1926    Encounter Date:2020  PRIMARY CARE: YAA BYRD  ***Signed***  --------------------------------------------------------------------------------------------------------------------  NURSE INTAKE      Visit Type      New Patient Visit            Chief Complaint      BREAST CANCER            History and Present Illness      Past Oncology Illness History      Ms. Beyer is a 89-year-old patient referred here by her primary care     provider, Dr. Yaa Byrd for new diagnosis of breast cancer.            I saw the patient in conjunction with Dr. Yael Gan, the breast cancer     surgeon.  She and I discussed the case amongst ourselves and with the patient     and her family member.  The patient states that she does self breast exams     nearly every month and noticed a palpable mass in her left breast approximately     2 months ago.  She initially had a screening mammogram in February and then went    on to have a diagnostic mammogram and ultrasound.  Mammogram showed a breast     mass that is 1.8 x 1.4 x 1.6 cm and spiculated.  There is also a left axillary     lymph node measuring 1.1 cm with cortical thickening.  The patient had a biopsy     of the breast mass only.  This was found to be invasive ductal carcinoma, grade     3, estrogen receptor positive (95%, strong), progesterone receptor positive     (10%, strong) HER-2 negative (0% by IHC), Ki-67 70%.            The patient's health is compromised by significant aortic stenosis.  She has had    prior valve repair.  She was recently seen by Dr. Mace for this reason after     she was hospitalized for acute diastolic heart failure.  He has her already     optimized with medical management but does not plan to do surgery.  He indicated    to Dr. Gan that she would be at moderate risk for death or complications    due to  breast surgery.            One of the patient's major concerns is the recent coronavirus outbreak.  She     would like to hold off on surgery until after the outbreak is over with because     she does not want to go to the hospital and risk exposure.  She also states that    she does not want chemotherapy.  So, we discussed options for reducing her risk.     The patient currently denies any new areas of pain.  She denies headaches,     confusion, weakness.  The only change she has noticed in the last 2 months is     worsening constipation.            ECOG Performance Status      1            PAST, FAMILY   Past Medical History      Past Medical History:  Diabetes Type 2, Hypertension, Kidney Disease      Other PMH:        Diastolic heart failure, CKD stage III-IV, hypertension, aortic valve      repair, atrial fibrillation      Hematology/Oncology (F):  Anemia, Breast Cancer, Kidney Cancer, Skin Cancer            Past Surgical History      Cataract Surgery, Valve Replacement            SKIN CANCER REMOVAL            Family History      Family History:  GI [place] (BROTHER), Kidney Cancer, Lung Cancer            Social History      Marital Status:  Single,       Lives independently:  No (LIVES WITH HER DAUGHTER)      Number of Children:  6            Tobacco Use      Tobacco status:  Never smoker      Currently Vaping:  No            Alcohol Use      Alcohol intake:  None            Substance Use      Substance use:  Denies use            REVIEW OF SYSTEMS      General:  Denies: Appetite Change, Fatigue, Fever, Night Sweats, Weight Gain,     Weight Loss      Eye:  Denies Blurred Vision, Denies Corrective Lenses, Denies Diplopia, Denies     Vision Changes      ENT:  Denies Headache, Denies Hearing Loss, Denies Hoarseness, Denies Sore     Throat      Cardiovascular:  Denies Chest Pain, Denies Palpitations      Respiratory:  Denies: Coughing Blood, Productive Cough, Shortness of Air,     Wheezing       Gastrointestinal:  Admits Constipation; Denies Bloody Stools, Denies Diarrhea,     Denies Nausea/Vomiting, Denies Problem Swallowing, Denies Unable to Control     Bowels      Genitourinary:  Denies Blood in Urine, Denies Incontinence, Denies Painful     Urination      Musculoskeletal:  Denies Back Pain, Denies Muscle Pain, Denies Painful Joints      Integumentary:  Denies Itching, Denies Lesions, Denies Rash      Neurologic:  Denies Dizziness, Denies Numbness\Tingling, Denies Seizures      Psychiatric:  Denies Anxiety, Denies Depression      Endocrine:  Denies Cold Intolerance, Denies Heat Intolerance      Hematologic/Lymphatic:  Denies Bruising, Denies Bleeding, Denies Enlarged Lymph     Nodes      Reproductive:  Denies: Menopause, Heavy Periods, Pregnant, Still Menstruating            VITAL SIGNS AND SCORES      Vitals      Height 5 ft 1.42 in / 156 cm      Weight 151 lbs 7.296 oz / 68.7 kg      BSA 1.69 m2      BMI 28.2 kg/m2      Temperature 98.8 F / 37.11 C - Temporal      Pulse 68      Respirations 20      Blood Pressure 144/46 Sitting, Left Arm      Pulse Oximetry 98%, NASAL CANNULA, 2 lpm            Pain Score      Experiencing any pain?:  No      Pain Scale Used:  Numerical      Pain Intensity:  0            Fatigue Score      Experiencing any fatigue?:  No      Fatigue (0-10 scale):  0 (none)            EXAM      General: Alert, cooperative, no acute distress      Eyes: Anicteric sclera, PERRLA      HEENT: Oropharynx clear, no exudates, nasal canula in place for oxygen      Breasts: firm mobile mass in the left breast that is causing some skin dimpling,    palpable left axillary lymph node that is suspicious given that it may be 2cm in    size      Respiratory: CTAB, normal respiratory effort      Abdomen: Normal active bowel sounds, no tenderness, no distention      Cardiovascular: RRR, no murmur, no peripheral edema      Skin: Normal tone, no rash, no lesions      Psychiatric: Appropriate affect,  intact judgment      Neurologic: No focal sensory or motor deficits, no weakness, numbness, dizziness      Musculoskeletal: Normal muscle strength, normal muscle tone      Extremities: No clubbing, cyanosis, or deformities            PREVENTION      Hx Influenza Vaccination:  Yes      Date Influenza Vaccine Given:  Dec 3, 2019      2 or More Falls Past Year?:  No      Fall Past Year with Injury?:  No      Hx Pneumococcal Vaccination:  Yes      Encouraged to follow-up with:  PCP regarding preventative exams.      Chart initiated by      GIAN LUA CMA            ALLERGY/MEDS      Allergies      Coded Allergies:             LATEX (Verified  Allergy, Unknown, RASH, 3/19/20)      Uncoded Allergies:             SEASONAL ALLERGIES (Allergy, Unknown, ITCHY EYES, RUNNY NOSE, 10/16/14)            Medications      Last Reconciled on 3/19/20 16:58 by ADRIANE LEGER      Anastrozole (Anastrozole) 1 Mg Tablet      1 MG PO QDAY, #30 TAB 3 Refills         Prov: ADRIANE LEGER         3/19/20       Lansoprazole (Lansoprazole) 30 Mg Capsule.dr      30 MG PO QDAY, CAP         Reported         3/19/20       Docusate Sodium (Colace) 100 Mg Capsule      PO HS PRN for CONSTIPATION, #30 CAP 0 Refills         Reported         3/19/20       Torsemide (Torsemide*) 20 Mg Tablet      40 MG PO BID, TAB         Reported         3/19/20       Torsemide (Torsemide*) 20 Mg Tablet      40 MG PO BID for 30 Days, #120 TAB 5 Refills         Prov: Marino Muñoz         8/5/19       hydrALAZINE HCL (hydrALAZINE HCL) 25 Mg Tablet      50 MG PO BID for 30 Days, #120 TAB 5 Refills         Prov: Marino Muñoz         8/5/19       Montelukast Sodium (Montelukast*) 10 Mg Tablet      10 MG PO HS for 30 Days, #30 TAB         Prov: Marino Muñoz         8/5/19       amLODIPine (amLODIPine) 5 Mg Tablet      5 MG PO QDAY, #30 TAB 5 Refills         Prov: DARVIN WHATLEY         8/5/19       Atorvastatin (Atorvastatin) 10 Mg Tablet      10 MG PO HS, #30 TAB 5  Refills         Prov: DARVIN WHATLEY         8/5/19       Nebivolol HCl (BYSTOLIC) 5 Mg Tablet      5 MG PO HS, #30 TAB 5 Refills         Prov: DARVIN WHATLEY         8/5/19       Docusate Sodium (Colace) 100 Mg Capsule      100 MG PO HS PRN for CONSTIPATION, #30 CAP 0 Refills         Reported         3/20/18       Lansoprazole (Prevacid) 30 Mg Capsule.dr      30 MG PO QDAY, CAP         Reported         10/16/14       Aspirin EC (Aspirin EC) 81 Mg Tabec      81 MG PO QDAY, #30 TAB.EC 0 Refills         Reported         10/16/14       Cholecalciferol (Vitamin D3*) 2,000 U Tablet      2000 IU PO QPM         Reported         2/21/12      Medications Reviewed:  Changes made to meds            IMPRESSION/PLAN      Impression      89-year-old female with a new diagnosis of hormone receptor positive left breast    cancer            Diagnosis      S/p nephrectomy - Z90.5            CRF (chronic renal failure) - N18.9            Breast cancer - C50.919            Notes      New Medications      * Torsemide (Torsemide*) 20 MG TABLET: 40 MG PO BID      * Docusate Sodium (Colace) 100 MG CAPSULE: PO HS PRN CONSTIPATION #30      * Lansoprazole 30 MG CAPSULE.DR: 30 MG PO QDAY      * Anastrozole 1 MG TABLET: 1 MG PO QDAY #30      Discontinued Medications      * Neb-Budesonide (Budesonide) 0.5 MG/2 ML AMPUL.NEB: 0.5 MG INH RTBID 30 Days       #60         Instructions: DIAGNOSIS CODE REQUIRED PRIOR TO PRESCRIBING.      * NEB-Levalbuterol (LEVALBUTEROL HCL) 0.63 MG/3 ML VIAL.NEB: 0.63 MG INH RTQ4H       WA 30 Days #90         Instructions: DIAGNOSIS CODE REQUIRED PRIOR TO PRESCRIBING..      New Diagnostics      * CT ABD/Pelvis/Chest WO [place], 6 WEEKS         Dx: S/p nephrectomy - Z90.5      * Bone Scan Wh Body NM, 6 WEEKS         Dx: S/p nephrectomy - Z90.5            Plan      Hormone receptor positive left breast cancer: I reviewed the results of the     pathology with the patient and her family member.  She does have ER positive/OH      positive/HER-2 negative breast cancer, grade 3, Ki-67 70%.  While this is a     hormone receptor positive breast cancer which typically is indolent slow-growing    I am concerned that the Ki-67 is very high suggesting this may be a more     aggressive malignancy.  However, the patient does not want to pursue     chemotherapy at all and does not want to have surgery now, during the cor    onavirus outbreak.  I explained to the patient the risk and benefit of an     aromatase inhibitor in the neoadjuvant setting.  With the hormone receptor     positive breast cancer I would expect that it would slowly improve over the     course of 6 months.  However because hers is more aggressive appearing my     concern is that it may in fact grow despite the AI.  However, given her wishes I    will start anastrozole and have her follow-up in 2 months.  At that point in     time I hope the coronavirus outbreak will be improving or resolved.  Dr. Yael Gan is can help me continue to monitor the patient and determine if the     mass is improving or getting worse.  Prior to her return to clinic I would like     for her to have staging scans, CT CAP without contrast (due to kidney function)     and bone scan.              Risk for osteoporosis/osteopenia: Given that the patient went through menopause     about 40 years ago she is at high risk for osteopenia or osteoporosis.  However,    she has been very active.  She has not had a DEXA scan at any point in the     recent past.  The patient is unaware if she is ever had a DEXA scan in fact.      Due to the desire to avoid exposing her to potential coronavirus infection I     will hold off on a DEXA scan until later.            Patient Education      Patient Education Provided:  Yes            Electronically signed by ADRIANE LEGER  03/19/2020 16:58       Disclaimer: Converted document may not contain table formatting or lab diagrams. Please see [employer]  System for the authenticated document.

## 2021-05-28 NOTE — PROGRESS NOTES
Patient: SOPHIA BEYER     Acct: [id number]     Report: #[id number]  UNIT #: [id number]     : 10/20/1926    Encounter Date:2020  PRIMARY CARE: YAA BYRD  ***Signed***  --------------------------------------------------------------------------------------------------------------------  TELEHEALTH NOTE      History of Present Illness            Chief Complaint: () BREAST CANCER             Sophia Beyer is presenting for evaluation via Telehealth visit by     phone. Verbal consent obtained before beginning visit.            Provider spent (7) minutes with the patient during telehealth visit.            The following staff were present during the visit: (Dayanna White, RN)                         Past Med History      Ms. Beyer is a 89-year-old patient referred here by her primary care     provider, Dr. Yaa Byrd for new diagnosis of breast cancer.            Patient's breast mass was found on mammogram in 2020 and was 1.8 x 1.4     x 1.6 cm.  There is also a left axillary lymph node measuring 1.1 cm with     cortical thickening.  The patient had a biopsy of the breast mass only.  This     was found to be invasive ductal carcinoma, grade 3, estrogen receptor positive     (95%, strong), progesterone receptor positive (10%, strong) HER-2 negative (0%     by IHC), Ki-67 70%.            The patient's health is compromised by significant aortic stenosis, CHF, and     CKD.              Due to concerns over spread of the coronavirus a decision was made to start the     patient on anastrozole prior to surgery.  She started anastrozole in 2020.     She did not noticed any significant change in the breast mass and proceeded to     lumpectomy on 2020.  Unfortunately pathology revealed a 4 cm tumor, grade     3, two lymph nodes were involved with cancer. It appears the mass grew in size     prior to surgery despite AI therapy.              DEXA scan on 2020:  Osteopenia.             CT chest, abdomen, and pelvis with contrast on 8/6/2020 showed:         1. Enlarging pulmonary nodules/masses likely representing metastatic disease      2. Postoperative changes in the left axilla/tail of the breast.      3. Mildly enlarging left axillary lymph nodes suspicious for metastatic     adenopathy      4. New hepatic metastases      5. Small right pleural effusion      6. Moderate cardiomegaly, worse in the interval      7. Previous right nephrectomy      8. Small left renal masses suspected to represent cysts given the interval     stability, however, they are incompletely evaluated without intravenous     contrast.                   Overview of Symptoms      I discussed the results of the recent CT scan with the patient.  Unfortunately     shows that her disease has likely progressed in the liver and in the lungs.  I     instructed her to stop anastrozole will start tamoxifen.  The patient and I both    agree she is not a candidate for chemotherapy or other aggressive therapies.      She has no new health concerns however and has been staying healthy at home.            Allergies/Medications      Allergies:        Coded Allergies:             LATEX (Verified  Allergy, Unknown, RASH, 7/23/20)           NO KNOWN DRUG ALLERGIES (Verified  Allergy, Unknown, 7/23/20)      Medications    Last Reconciled on 8/19/20 20:11 by ADRIANE LEGER      Tamoxifen Citrate (Tamoxifen*) 20 Mg Tab      20 MG PO QDAY for 30 Days, #30 TAB 4 Refills         Prov: ADRIANE LEGER         8/13/20       HYDROcodone-Acetaminophen 5-325 Mg (HYDROcodone-Acetaminophen 5-325 Mg) 1 Each     Tablet      2 TAB PO Q4H PRN for PAIN, #30 TAB         Prov: TOMER FONG MD         7/14/20       amLODIPine (amLODIPine) 5 Mg Tablet      5 MG PO HS, #30 TAB         Reported         7/14/20       hydrALAZINE HCL (hydrALAZINE HCL) 50 Mg Tablet      50 MG PO BID, #60 TAB 0 Refills         Reported         7/8/20        hydrALAZINE HCL (hydrALAZINE HCL) 25 Mg Tablet      25 MG PO BID, #60 TAB 0 Refills         Reported         7/8/20       Lansoprazole (Lansoprazole) 30 Mg Capsule.dr      30 MG PO QDAY, CAP         Reported         3/19/20       Torsemide (Torsemide*) 20 Mg Tablet      40 MG PO BID, TAB         Reported         3/19/20       Montelukast Sodium (Montelukast*) 10 Mg Tablet      10 MG PO HS for 30 Days, #30 TAB         Prov: Marino Muñoz         8/5/19       Atorvastatin (Atorvastatin) 10 Mg Tablet      10 MG PO HS, #30 TAB 5 Refills         Prov: ALTAGRACIA WHATLEYMALLYDELMA         8/5/19       Nebivolol HCl (BYSTOLIC) 5 Mg Tablet      5 MG PO HS, #30 TAB 5 Refills         Prov: ALTAGRACIA WHATLEYTOMASA         8/5/19       Docusate Sodium (Colace) 100 Mg Capsule      100 MG PO HS PRN for CONSTIPATION, #30 CAP 0 Refills         Reported         3/20/18       Aspirin EC (Aspirin EC) 81 Mg Tabec      81 MG PO QDAY, #30 TAB.EC 0 Refills         Reported         10/16/14            Plan/Instructions      Ambulatory Assessment/Plan:        Notes      New Medications      * Tamoxifen Citrate (Tamoxifen*) 20 MG TAB: 20 MG PO QDAY 30 Days #30      Discontinued Medications      * Anastrozole 1 MG TABLET: 1 MG PO QDAY #30      Plan/Instructions            * Plan Of Care: (Hormone receptor positive breast cancer)      * Patient was diagnosed with a normal receptor positive breast cancer in March       of this year.  She initially was treated with neoadjuvant anastrozole and then      underwent lumpectomy.  Unfortunately her tumor appeared to grow while she was       on anastrozole.  Recent CT scans show she also has progression of metastatic       disease in the lung and liver.  I will switch her medication from anastrozole       to tamoxifen.  I will call her in 2 weeks to follow-up on any symptoms that       she is experiencing.            * Chronic conditions reviewed and taken into consideration for today's treatment      plan.      * Patient  instructed to seek medical attention urgently for new or worsening       symptoms.      * Patient was educated/instructed on their diagnosis, treatment and medications       prior to discharge from the clinic today.      Codes:  Phone Eval 5-10 min [id number]            Electronically signed by ADRIANE LEGER  08/19/2020 20:11       Disclaimer: Converted document may not contain table formatting or lab diagrams. Please see [employer] [facility] for the authenticated document.